# Patient Record
Sex: FEMALE | Race: WHITE | NOT HISPANIC OR LATINO | Employment: FULL TIME | URBAN - METROPOLITAN AREA
[De-identification: names, ages, dates, MRNs, and addresses within clinical notes are randomized per-mention and may not be internally consistent; named-entity substitution may affect disease eponyms.]

---

## 2020-10-09 ENCOUNTER — TELEPHONE (OUTPATIENT)
Dept: OBSTETRICS AND GYNECOLOGY | Facility: CLINIC | Age: 30
End: 2020-10-09

## 2020-10-09 ENCOUNTER — TELEPHONE (OUTPATIENT)
Dept: OBSTETRICS AND GYNECOLOGY | Age: 30
End: 2020-10-09

## 2020-10-09 NOTE — TELEPHONE ENCOUNTER
I do not have any new patient slots for a little while, if she might have retained products from a miscarriage, she should be evaluated in the emergency department.    Loretta Arana MD  10/9/2020  12:08 EDT

## 2020-10-09 NOTE — TELEPHONE ENCOUNTER
Good Morning,     Patient had a positive pregnancy test on 9/20, states she has an IUD and believes she may have miscarried starting on 9/24. She had heavy bleeding with cramps, discharge with odor and severe pain during that time. She was told she could possibly be still pregnant and she should come in to be checked as soon as possible since she still has an IUD. Would you be able to see her next week?    Please advise, thank you.

## 2020-10-09 NOTE — TELEPHONE ENCOUNTER
New Gyn thinks she recently M/C. Pt has had a paraguard for 9 years. Pt took a pregnancy test that was positive on 9-22. Pt started having bleeding 9-26, heavy with clots and cramping severely. Pt wants to see someone for f/u to make sure that everything has passed and also to see if there is anything that she needs to do as far as the paraguard . Pt states that she has never had an OB GYN doctor. [facility]  No new pt slots available soon, would you have anywhere that you could see the patient?

## 2020-10-12 ENCOUNTER — OFFICE VISIT (OUTPATIENT)
Dept: OBSTETRICS AND GYNECOLOGY | Facility: CLINIC | Age: 30
End: 2020-10-12

## 2020-10-12 VITALS — BODY MASS INDEX: 34.67 KG/M2 | DIASTOLIC BLOOD PRESSURE: 72 MMHG | SYSTOLIC BLOOD PRESSURE: 120 MMHG | WEIGHT: 202 LBS

## 2020-10-12 DIAGNOSIS — T83.32XA MALPOSITIONED INTRAUTERINE DEVICE (IUD), INITIAL ENCOUNTER: ICD-10-CM

## 2020-10-12 DIAGNOSIS — Z97.5 IUD (INTRAUTERINE DEVICE) IN PLACE: Primary | ICD-10-CM

## 2020-10-12 DIAGNOSIS — Z32.01 POSITIVE PREGNANCY TEST: ICD-10-CM

## 2020-10-12 DIAGNOSIS — Z12.4 CERVICAL CANCER SCREENING: ICD-10-CM

## 2020-10-12 PROBLEM — Z30.432 ENCOUNTER FOR REMOVAL OF INTRAUTERINE CONTRACEPTIVE DEVICE (IUD): Status: ACTIVE | Noted: 2020-10-12

## 2020-10-12 LAB
B-HCG UR QL: NEGATIVE
INTERNAL NEGATIVE CONTROL: POSITIVE
INTERNAL POSITIVE CONTROL: NEGATIVE
Lab: NORMAL

## 2020-10-12 PROCEDURE — 58301 REMOVE INTRAUTERINE DEVICE: CPT | Performed by: STUDENT IN AN ORGANIZED HEALTH CARE EDUCATION/TRAINING PROGRAM

## 2020-10-12 PROCEDURE — 99203 OFFICE O/P NEW LOW 30 MIN: CPT | Performed by: STUDENT IN AN ORGANIZED HEALTH CARE EDUCATION/TRAINING PROGRAM

## 2020-10-12 PROCEDURE — 81025 URINE PREGNANCY TEST: CPT | Performed by: STUDENT IN AN ORGANIZED HEALTH CARE EDUCATION/TRAINING PROGRAM

## 2020-10-12 NOTE — PROGRESS NOTES
Chief Complaint   Patient presents with   • Gynecologic Exam     Patient with Paraguard since . Patient c/o positive UCG in September and heavy bleeding with clotting on 20. Last pap 5 yrs ago        SUBJECTIVE:     Melissa Sánchez is a 30 y.o.  who presents with concerns of miscarriage in setting of ParaGard IUD. The patient reports having the ParaGard IUD in place since  and had a positive home pregnancy test on 20. She reports experiencing heavy vaginal bleeding with clots on 20. This lasted for 3 days and was worse on the second day with accompanying severe back pain and stabbing abdominal cramping. She reports she felt terrible for those 3 days. She reports that she is doing well now but is concerned if she miscarried or still experiencing pregnancy.     She reports having regular monthly menses every 28-30 days that lasted for 3-4 days with varying amount of bleeding.     This was her second pregnancy. She had a surgical  in .     Patient has not had a pap smear in 5 years. Denies abnormal pap smears.     Past Medical History:   Diagnosis Date   • Disease of thyroid gland       Past Surgical History:   Procedure Laterality Date   • KNEE SURGERY     • WISDOM TOOTH EXTRACTION        Social History     Tobacco Use   • Smoking status: Never Smoker   • Smokeless tobacco: Never Used   Substance Use Topics   • Alcohol use: Yes     Alcohol/week: 3.0 standard drinks     Types: 3 Cans of beer per week   • Drug use: Never     OB History   No obstetric history on file.        Review of Systems   Constitutional: Negative for chills and fever.   HENT: Negative for congestion and sore throat.    Respiratory: Negative for cough and shortness of breath.    Cardiovascular: Negative for chest pain and leg swelling.   Gastrointestinal: Negative for abdominal pain, constipation and diarrhea.   Genitourinary: Negative for dysuria and vaginal bleeding.   Skin: Negative for rash and  wound.   Neurological: Negative for dizziness and light-headedness.       OBJECTIVE:   Vitals:    10/12/20 1309   BP: 120/72   Weight: 91.6 kg (202 lb)        Physical Exam  Vitals signs reviewed. Exam conducted with a chaperone present.   Constitutional:       Appearance: Normal appearance. She is obese.   HENT:      Head: Normocephalic and atraumatic.      Right Ear: External ear normal.      Left Ear: External ear normal.   Eyes:      Extraocular Movements: Extraocular movements intact.      Pupils: Pupils are equal, round, and reactive to light.   Pulmonary:      Effort: Pulmonary effort is normal. No respiratory distress.   Abdominal:      General: There is no distension.      Palpations: Abdomen is soft. There is no mass.      Tenderness: There is no abdominal tenderness. There is no guarding or rebound.      Hernia: No hernia is present.   Genitourinary:     General: Normal vulva.      Labia:         Right: No rash, tenderness, lesion or injury.         Left: No rash, tenderness, lesion or injury.       Urethra: No prolapse or urethral lesion.      Vagina: Vaginal discharge present. No erythema, tenderness, bleeding or lesions.      Cervix: Normal.      Uterus: Normal. Not enlarged, not fixed and not tender.       Adnexa: Right adnexa normal and left adnexa normal.        Right: No mass, tenderness or fullness.          Left: No mass, tenderness or fullness.        Comments: Thick yellow discharge in vaginal vault. IUD strings visualized at external cervical os.   Musculoskeletal: Normal range of motion.         General: No swelling.   Lymphadenopathy:      Lower Body: No right inguinal adenopathy. No left inguinal adenopathy.   Skin:     General: Skin is warm and dry.   Neurological:      General: No focal deficit present.      Mental Status: She is alert and oriented to person, place, and time.   Psychiatric:         Mood and Affect: Mood normal.         Behavior: Behavior normal.     UPT: negative on  10/12/20    ASSESSMENT:   Suspect spontaneous    ParaGard IUD in place  Cervical cancer screening     PLAN:   I suspect that the patient has experienced a spontaneous  based on history provided by the patient in the setting of positive home pregnancy test. UPT today negative. Will order serum HCG to evaluate for previous pregnancy and pelvic ultrasound to evaluate for IUD malposition given recent positive pregnancy test. Follow up to be determined by imaging and lab work. Patient agrees with plan of care and all questions and concerns answered.    Pap smear with reflex HPV for ASCUS collected today for cervical cancer screening.    See below for orders    Orders Placed This Encounter   Procedures   • US Non-ob Transvaginal     Order Specific Question:   Reason for Exam:     Answer:   positive pregnancy test in setting of IUD in place   • HCG, B-subunit, Quantitative   • POC Pregnancy, Urine      No follow-ups on file.    Magnolia Diaz MD  10/12/2020  14:34 EDT     Addendum:  Patient returns to the clinic after having ultrasound performed today at [facility] that demonstrated her IUD in the lower uterine segment/cervix. Recommended removal and patient re-presents from ultrasound appointment for removal.     ParaGard IUD Removal Procedure Note    Indications: Malpositioned IUD   Pre Procedural Diagnosis: Malpositioned ParaGard IUD    Post Procedural Diagnosis: Encounter for successful removal of malpositioned ParaGard IUD     Counseling:  Patient was counseled on risks of IUD removal including but not limited to abnormal bleeding, infection, pregnancy, need for additional procedures and/or need for surgery.  All questions were answered to apparent satisfaction. We discussed that the patient should take a prenatal vitamin with 400 mcg of folic acid if she is not using reliable contraception. We discussed the family planning method and advised using ovulation predictor kits initially to determine  when she ovulates.     Procedure Details     Bimanual exam revealed anteverted, normal size uterus.  A speculum was inserted. The IUD strings were seen, grasped with a ring forcep and removed without difficulty.  The ParaGard IUD was inspected and noted to be removed in its entirety.  Patient tolerated procedure well.    IUD Information:  See medication record.    Condition:  Stable    Complications:  None    Plan:    The patient was advised to call for any fever or for prolonged or severe pain or bleeding; she was also advised regarding other contraceptive methods but declines contraception at this time. She thinks she will use condoms and the family planning method. Advised to start PNV. She was advised to use OTC analgesics as needed for mild to moderate pain.  Return to the clinic PRN for follow-up.    Magnolia Diaz MD  10/12/2020  15:17 EDT

## 2020-10-13 ENCOUNTER — TELEPHONE (OUTPATIENT)
Dept: OBSTETRICS AND GYNECOLOGY | Facility: CLINIC | Age: 30
End: 2020-10-13

## 2020-10-13 LAB — HCG INTACT+B SERPL-ACNC: <1 MIU/ML

## 2020-10-13 NOTE — TELEPHONE ENCOUNTER
Called patient to discuss results of HCG. Verified name and . We discussed that HCG <1 and no further follow up indicated. Still awaiting pap smear but patient will be notified of results. All questions and concerns indicated.    Magnolia Diaz MD  10/13/2020  10:43 EDT

## 2020-10-14 ENCOUNTER — TELEPHONE (OUTPATIENT)
Dept: OBSTETRICS AND GYNECOLOGY | Facility: CLINIC | Age: 30
End: 2020-10-14

## 2020-10-14 LAB
CONV .: NORMAL
CYTOLOGIST CVX/VAG CYTO: NORMAL
CYTOLOGY CVX/VAG DOC CYTO: NORMAL
CYTOLOGY CVX/VAG DOC THIN PREP: NORMAL
DX ICD CODE: NORMAL
HIV 1 & 2 AB SER-IMP: NORMAL
OTHER STN SPEC: NORMAL
STAT OF ADQ CVX/VAG CYTO-IMP: NORMAL

## 2020-10-14 NOTE — TELEPHONE ENCOUNTER
Called patient to discuss pap smear results. Verified name and . We reviewed negative pap smear and recommend repeat pap smear in 3 years. All questions and concerns answered.    Magnolia Diaz MD  10/14/2020  16:20 EDT

## 2021-03-13 ENCOUNTER — IMMUNIZATION (OUTPATIENT)
Dept: VACCINE CLINIC | Facility: HOSPITAL | Age: 31
End: 2021-03-13

## 2021-03-13 PROCEDURE — 0001A: CPT | Performed by: INTERNAL MEDICINE

## 2021-03-13 PROCEDURE — 91300 HC SARSCOV02 VAC 30MCG/0.3ML IM: CPT | Performed by: INTERNAL MEDICINE

## 2021-04-03 ENCOUNTER — IMMUNIZATION (OUTPATIENT)
Dept: VACCINE CLINIC | Facility: HOSPITAL | Age: 31
End: 2021-04-03

## 2021-04-03 PROCEDURE — 91300 HC SARSCOV02 VAC 30MCG/0.3ML IM: CPT | Performed by: INTERNAL MEDICINE

## 2021-04-03 PROCEDURE — 0002A: CPT | Performed by: INTERNAL MEDICINE

## 2021-12-18 ENCOUNTER — IMMUNIZATION (OUTPATIENT)
Dept: VACCINE CLINIC | Facility: HOSPITAL | Age: 31
End: 2021-12-18

## 2021-12-18 PROCEDURE — 91300 HC SARSCOV02 VAC 30MCG/0.3ML IM: CPT | Performed by: INTERNAL MEDICINE

## 2021-12-18 PROCEDURE — 0004A HC ADM SARSCOV2 30MCG/0.3ML BOOSTER: CPT | Performed by: INTERNAL MEDICINE

## 2022-03-16 ENCOUNTER — OFFICE VISIT (OUTPATIENT)
Dept: GASTROENTEROLOGY | Facility: CLINIC | Age: 32
End: 2022-03-16

## 2022-03-16 ENCOUNTER — TELEPHONE (OUTPATIENT)
Dept: GASTROENTEROLOGY | Facility: CLINIC | Age: 32
End: 2022-03-16

## 2022-03-16 VITALS — TEMPERATURE: 97.1 F | WEIGHT: 212.4 LBS | BODY MASS INDEX: 35.39 KG/M2 | HEIGHT: 65 IN

## 2022-03-16 DIAGNOSIS — K62.5 RECTAL BLEEDING: ICD-10-CM

## 2022-03-16 DIAGNOSIS — R14.0 BLOATING SYMPTOM: ICD-10-CM

## 2022-03-16 DIAGNOSIS — R19.4 CHANGE IN BOWEL HABITS: ICD-10-CM

## 2022-03-16 DIAGNOSIS — R11.2 NAUSEA AND VOMITING, INTRACTABILITY OF VOMITING NOT SPECIFIED, UNSPECIFIED VOMITING TYPE: ICD-10-CM

## 2022-03-16 DIAGNOSIS — R10.84 GENERALIZED ABDOMINAL PAIN: Primary | ICD-10-CM

## 2022-03-16 DIAGNOSIS — R19.7 DIARRHEA, UNSPECIFIED TYPE: ICD-10-CM

## 2022-03-16 DIAGNOSIS — Z80.0 FAMILY HISTORY OF COLON CANCER: ICD-10-CM

## 2022-03-16 DIAGNOSIS — R68.81 EARLY SATIETY: ICD-10-CM

## 2022-03-16 PROCEDURE — 99204 OFFICE O/P NEW MOD 45 MIN: CPT | Performed by: NURSE PRACTITIONER

## 2022-03-16 NOTE — PROGRESS NOTES
Chief Complaint   Patient presents with   • Abdominal Pain       HPI    Melissa Sánchez is a  32 y.o. female here to establish care as a new patient for complaints of abdominal pain.    This patient will also follow with Dr. Woodson.    Patient reports 4 years of generalized abdominal discomfort manifesting as bloating-aching worse over the last several months.  Comes and goes.  Associated nausea and vomiting which is a new symptom.  Frequent early satiety.  Denies dysphagia or odynophagia.  She reports several episodes of diarrhea in between she will pass formed stools.  1 episode of rectal bleeding.    Patient reports being a vegan most of her life started eating meat again 6 years ago has been following with an herbalist.    Recent CT with contrast unremarkable.    Recent hemogram, LFTs, and TSH normal.    Reports family history of colon cancer in a paternal grandparent.  She has several second-degree family members with Crohn's disease.    Past Medical History:   Diagnosis Date   • Disease of thyroid gland        Past Surgical History:   Procedure Laterality Date   • KNEE SURGERY     • MOUTH SURGERY     • WISDOM TOOTH EXTRACTION         Scheduled Meds:     Continuous Infusions: No current facility-administered medications for this visit.      PRN Meds:     Allergies   Allergen Reactions   • Influenza Virus Vaccine Anaphylaxis       Social History     Socioeconomic History   • Marital status: Single   Tobacco Use   • Smoking status: Never Smoker   • Smokeless tobacco: Never Used   Substance and Sexual Activity   • Alcohol use: Yes     Alcohol/week: 3.0 standard drinks     Types: 3 Cans of beer per week     Comment: social   • Drug use: Never       Family History   Problem Relation Age of Onset   • Hypertension Mother    • Hypertension Father    • Hypertension Maternal Grandmother    • Hypertension Maternal Grandfather    • Hypertension Paternal Grandmother    • Colon cancer Paternal Grandfather    • Hypertension  Paternal Grandfather    • Breast cancer Neg Hx    • Ovarian cancer Neg Hx    • Uterine cancer Neg Hx        Review of Systems   Constitutional: Negative for activity change, appetite change, fatigue and unexpected weight change.   HENT: Negative for trouble swallowing.    Eyes: Negative.    Respiratory: Negative.    Cardiovascular: Negative.    Gastrointestinal: Positive for abdominal pain, diarrhea, nausea and vomiting. Negative for abdominal distention, anal bleeding, blood in stool, constipation and rectal pain.   Endocrine: Negative.    Genitourinary: Negative.    Musculoskeletal: Negative.    Allergic/Immunologic: Negative.    Neurological: Negative.    Hematological: Negative.    Psychiatric/Behavioral: Negative.        Vitals:    03/16/22 1430   Temp: 97.1 °F (36.2 °C)       Physical Exam  Constitutional:       Appearance: She is well-developed.   Abdominal:      General: Bowel sounds are normal. There is no distension.      Palpations: Abdomen is soft. There is no mass.      Tenderness: There is no abdominal tenderness. There is no guarding.      Hernia: No hernia is present.   Skin:     General: Skin is warm and dry.      Capillary Refill: Capillary refill takes less than 2 seconds.   Neurological:      Mental Status: She is alert and oriented to person, place, and time.   Psychiatric:         Behavior: Behavior normal.       Assessment    Diagnoses and all orders for this visit:    1. Generalized abdominal pain (Primary)  -     Case Request; Standing  -     Case Request    2. Bloating symptom  -     Case Request; Standing  -     Case Request    3. Nausea and vomiting, intractability of vomiting not specified, unspecified vomiting type  -     Case Request; Standing  -     Case Request    4. Early satiety  -     Case Request; Standing  -     Case Request    5. Change in bowel habits  -     Case Request; Standing  -     Case Request    6. Diarrhea, unspecified type  -     Case Request; Standing  -     Case  Request    7. Rectal bleeding  -     Case Request; Standing  -     Case Request    8. Family history of colon cancer       Plan    Arrange bidirectional endoscopic examination to be performed by Dr. Woodson at [facility] rule out celiac disease, inflammatory bowel disease, establish the presence of irritable bowel syndrome.  Discussed the benefits of IBgard and FD guard, educational handouts provided to the patient.  Further recommendations and follow-up pending the aforementioned work-up.  Re/benefits of procedures reviewed the patient all questions were answered.  Consider HIDA scan in the future if warranted.         HARSHAL Ray  [facility]  [address] [place] [address]  Office: [phone]

## 2022-03-16 NOTE — TELEPHONE ENCOUNTER
ARA patient in office for EGD/CS. Scheduled 04/25/2022 with arrival time 1:30pm. Prep packet handed to patient. Also advised arrival time may vary based on [facility] guidelines. ARA Mccoy--Rebecca

## 2022-03-18 ENCOUNTER — TRANSCRIBE ORDERS (OUTPATIENT)
Dept: ADMINISTRATIVE | Facility: HOSPITAL | Age: 32
End: 2022-03-18

## 2022-03-18 DIAGNOSIS — Z01.818 OTHER SPECIFIED PRE-OPERATIVE EXAMINATION: Primary | ICD-10-CM

## 2022-03-18 NOTE — TELEPHONE ENCOUNTER
ARA patient via telephone for EGD/CS. Rescheduled  to 04/27/2022 with arrival time 8:30am. Prep packet mailed to verified address on file. Also advised arrival time may change based on [facility] guidelines. ARA Mccoy--Rebecca

## 2022-09-01 ENCOUNTER — OFFICE VISIT (OUTPATIENT)
Dept: OBSTETRICS AND GYNECOLOGY | Facility: CLINIC | Age: 32
End: 2022-09-01

## 2022-09-01 VITALS
BODY MASS INDEX: 34.82 KG/M2 | DIASTOLIC BLOOD PRESSURE: 66 MMHG | SYSTOLIC BLOOD PRESSURE: 109 MMHG | WEIGHT: 209 LBS | HEIGHT: 65 IN

## 2022-09-01 DIAGNOSIS — Z01.419 WELL WOMAN EXAM WITH ROUTINE GYNECOLOGICAL EXAM: Primary | ICD-10-CM

## 2022-09-01 DIAGNOSIS — Z30.09 ENCOUNTER FOR COUNSELING REGARDING CONTRACEPTION: ICD-10-CM

## 2022-09-01 PROCEDURE — 99395 PREV VISIT EST AGE 18-39: CPT | Performed by: STUDENT IN AN ORGANIZED HEALTH CARE EDUCATION/TRAINING PROGRAM

## 2022-09-01 PROCEDURE — 2014F MENTAL STATUS ASSESS: CPT | Performed by: STUDENT IN AN ORGANIZED HEALTH CARE EDUCATION/TRAINING PROGRAM

## 2022-09-01 NOTE — PROGRESS NOTES
"GYN Annual Exam     CC- Here for annual exam.     Melissa Sánchez is a 32 y.o. female who presents for annual well woman exam. Periods are monthly, lasting 2 days. Dysmenorrhea: moderate cramping for 1-2 days of flow. Cyclic symptoms include none. No intermenstrual bleeding, spotting, or discharge.    OB History        2    Para        Term                AB   1    Living           SAB   1    IAB        Ectopic        Molar        Multiple        Live Births                    Current contraception: condoms  History of abnormal Pap smear: no  Family history of uterine, colon or ovarian cancer: yes - paternal granfather with colon cancer  History of abnormal mammogram: n/a  Family history of breast cancer: no  Last Pap : 10/12/20- NILM    Past Medical History:   Diagnosis Date   • Disease of thyroid gland        Past Surgical History:   Procedure Laterality Date   • KNEE SURGERY     • MOUTH SURGERY     • WISDOM TOOTH EXTRACTION           Current Outpatient Medications:   •  PARAGARD INTRAUTERINE COPPER IU, by Intrauterine route., Disp: , Rfl:     Allergies   Allergen Reactions   • Influenza Virus Vaccine Anaphylaxis       Social History     Tobacco Use   • Smoking status: Never Smoker   • Smokeless tobacco: Never Used   Substance Use Topics   • Alcohol use: Yes     Alcohol/week: 3.0 standard drinks     Types: 3 Cans of beer per week     Comment: social   • Drug use: Never       Family History   Problem Relation Age of Onset   • Hypertension Mother    • Hypertension Father    • Hypertension Maternal Grandmother    • Hypertension Maternal Grandfather    • Hypertension Paternal Grandmother    • Colon cancer Paternal Grandfather    • Hypertension Paternal Grandfather    • Breast cancer Neg Hx    • Ovarian cancer Neg Hx    • Uterine cancer Neg Hx        Review of Systems   All other systems reviewed and are negative.      /66   Ht 165.1 cm (65\")   Wt 94.8 kg (209 lb)   LMP 2022   BMI 34.78 " kg/m²     Physical Exam  Vitals reviewed.   Constitutional:       General: She is not in acute distress.  HENT:      Head: Normocephalic and atraumatic.      Right Ear: External ear normal.      Left Ear: External ear normal.   Eyes:      Extraocular Movements: Extraocular movements intact.      Pupils: Pupils are equal, round, and reactive to light.   Cardiovascular:      Rate and Rhythm: Normal rate and regular rhythm.   Pulmonary:      Effort: Pulmonary effort is normal. No respiratory distress.   Chest:   Breasts: Breasts are symmetrical.      Right: No swelling, bleeding, inverted nipple, mass, nipple discharge, skin change, tenderness, axillary adenopathy or supraclavicular adenopathy.      Left: No swelling, bleeding, inverted nipple, mass, nipple discharge, skin change, tenderness, axillary adenopathy or supraclavicular adenopathy.       Abdominal:      General: There is no distension.      Palpations: Abdomen is soft.      Tenderness: There is no abdominal tenderness. There is no guarding or rebound.   Genitourinary:     General: Normal vulva.      Exam position: Lithotomy position.      Labia:         Right: No rash, tenderness, lesion or injury.         Left: No rash, tenderness, lesion or injury.       Urethra: No prolapse or urethral swelling.      Vagina: No vaginal discharge, erythema, tenderness, bleeding or lesions.      Cervix: Normal.      Uterus: Not enlarged, not fixed and not tender.       Adnexa:         Right: No mass, tenderness or fullness.          Left: No mass, tenderness or fullness.     Musculoskeletal:         General: No deformity. Normal range of motion.      Cervical back: Normal range of motion and neck supple.   Lymphadenopathy:      Upper Body:      Right upper body: No supraclavicular or axillary adenopathy.      Left upper body: No supraclavicular or axillary adenopathy.      Lower Body: No right inguinal adenopathy. No left inguinal adenopathy.   Skin:     General: Skin is  warm and dry.   Neurological:      General: No focal deficit present.      Mental Status: She is alert and oriented to person, place, and time.   Psychiatric:         Mood and Affect: Mood normal.         Behavior: Behavior normal.         Assessment     1) GYN annual well woman exam.   2) Contraception counseling      Plan     1) Breast Health - Clinical breast exam yearly, Self breast awareness monthly  2) Pap - Up to date. Per ASCCP guidelines, repeat pap smear is due in 2023 and recommend cotesting at that time.   3) Smoking status- non-smoker.  4) Activity recommends - Adult 150-300 min/week of multi-component physical activities that include balance training, aerobic and physical strengthening.    5) Contraception- Patient does not want any hormonal birth control nor does she wish to use the non-hormonal IUD. She currently is using condoms but would like another method in addition to condoms. We discussed Phexxi and how the method works, the side effects, failure rate, etc. The patient would like to use this and discussed that it can be used concomitant with condoms. Prescription sent to her pharmacy.   6) Follow up prn and one year.       Magnolia Diaz MD

## 2023-11-02 ENCOUNTER — OFFICE VISIT (OUTPATIENT)
Dept: ORTHOPEDIC SURGERY | Facility: CLINIC | Age: 33
End: 2023-11-02
Payer: MEDICAID

## 2023-11-02 VITALS — HEIGHT: 65 IN | WEIGHT: 225.4 LBS | BODY MASS INDEX: 37.55 KG/M2 | TEMPERATURE: 97.8 F

## 2023-11-02 DIAGNOSIS — M95.8 OSTEOCHONDRAL DEFECT OF ANKLE: Primary | ICD-10-CM

## 2023-11-02 DIAGNOSIS — R52 PAIN: ICD-10-CM

## 2023-11-02 PROCEDURE — 99204 OFFICE O/P NEW MOD 45 MIN: CPT | Performed by: ORTHOPAEDIC SURGERY

## 2023-11-02 RX ORDER — AZELASTINE 1 MG/ML
SPRAY, METERED NASAL
COMMUNITY

## 2023-11-02 RX ORDER — CYANOCOBALAMIN 1000 UG/ML
INJECTION, SOLUTION INTRAMUSCULAR; SUBCUTANEOUS
COMMUNITY

## 2023-11-02 RX ORDER — MELOXICAM 15 MG/1
TABLET ORAL
COMMUNITY

## 2023-11-02 RX ORDER — ASCORBIC ACID 500 MG
TABLET ORAL
COMMUNITY

## 2023-11-02 RX ORDER — DULOXETIN HYDROCHLORIDE 60 MG/1
CAPSULE, DELAYED RELEASE ORAL
COMMUNITY

## 2023-11-02 RX ORDER — FERROUS SULFATE 325(65) MG
TABLET ORAL
COMMUNITY

## 2023-11-02 RX ORDER — SPIRONOLACTONE 50 MG/1
TABLET, FILM COATED ORAL
COMMUNITY
Start: 2014-06-09

## 2023-11-02 RX ORDER — PHENTERMINE HYDROCHLORIDE 37.5 MG/1
TABLET ORAL
COMMUNITY

## 2023-11-02 RX ORDER — THYROID 30 MG/1
TABLET ORAL
COMMUNITY

## 2023-11-02 RX ORDER — ERGOCALCIFEROL 1.25 MG/1
CAPSULE ORAL
COMMUNITY

## 2023-11-02 RX ORDER — FEXOFENADINE HCL AND PSEUDOEPHEDRINE HCI 60; 120 MG/1; MG/1
TABLET, EXTENDED RELEASE ORAL
COMMUNITY

## 2023-11-02 NOTE — PROGRESS NOTES
General Exam    Patient: Melissa Sánchez    YOB: 1990    Medical Record Number: [id number]    Chief Complaints: Bilateral ankle pain    History of Present Illness:     33 y.o. female patient who presents for evaluation of bilateral ankle pain.  Patient states she started having symptoms when she was 17 and told that she would need surgery due to some disintegration of her bone.  She states she is just been continue to deal with the symptoms she had ups and downs and flareups but has been able to do okay however over the past few months the symptoms have been quite severe at her ankle mainly medially based.    Denies any numbness or tingling.  Denies any fevers, cough or shortness of breath.    Allergies:   Allergies   Allergen Reactions    Influenza Virus Vaccine Anaphylaxis       Home Medications:      Current Outpatient Medications:     Lactic Ac-Citric Ac-Pot Bitart (PHEXXI) 1.8-1-0.4 % gel, Insert 1 applicator into the vagina 1 (One) Time As Needed (sexual intercourse) for up to 12 doses., Disp: 12 applicator, Rfl: 11    ascorbic acid (VITAMIN C) 500 MG tablet, Vitamin C 500 mg tablet (Patient not taking: Reported on 11/2/2023), Disp: , Rfl:     azelastine (ASTELIN) 0.1 % nasal spray, azelastine 137 mcg (0.1 %) nasal spray aerosol (Patient not taking: Reported on 11/2/2023), Disp: , Rfl:     cholecalciferol (VITAMIN D3) 1.25 MG (35441 UT) capsule, cholecalciferol (vitamin D3) 1,250 mcg (50,000 unit) capsule  Take 1 capsule every week by oral route for 30 days. (Patient not taking: Reported on 11/2/2023), Disp: , Rfl:     cyanocobalamin (VITAMIN B-12) 1000 MCG tablet, cyanocobalamin (vit B-12) 1,000 mcg tablet  TAKE ONE TABLET BY MOUTH FOR 30 DAYS. (Patient not taking: Reported on 11/2/2023), Disp: , Rfl:     Cyanocobalamin 1000 MCG sublingual tablet, cyanocobalamin (vit B-12) 1,000 mcg sublingual tablet  Place 1 tablet every day by sublingual route for 30 days. (Patient not taking: Reported on  11/2/2023), Disp: , Rfl:     cyanocobalamin 1000 MCG/ML injection, cyanocobalamin (vit B-12) 1,000 mcg/mL injection solution (Patient not taking: Reported on 11/2/2023), Disp: , Rfl:     DULoxetine (CYMBALTA) 60 MG capsule, duloxetine 60 mg capsule,delayed release (Patient not taking: Reported on 11/2/2023), Disp: , Rfl:     ergocalciferol (ERGOCALCIFEROL) 1.25 MG (70324 UT) capsule, ergocalciferol (vitamin D2) 1,250 mcg (50,000 unit) capsule (Patient not taking: Reported on 11/2/2023), Disp: , Rfl:     ferrous sulfate 325 (65 FE) MG tablet, FeroSul 325 mg (65 mg iron) tablet (Patient not taking: Reported on 11/2/2023), Disp: , Rfl:     fexofenadine-pseudoephedrine (Allegra-D Allergy & Congestion)  MG per 12 hr tablet, Allegra-D 12 Hour 60 mg-120 mg tablet,extended release (Patient not taking: Reported on 11/2/2023), Disp: , Rfl:     loratadine-pseudoephedrine (CLARITIN-D 12-hour) 5-120 MG per 12 hr tablet, Loratadine-D 5 mg-120 mg tablet,extended release 12 hr (Patient not taking: Reported on 11/2/2023), Disp: , Rfl:     meloxicam (MOBIC) 15 MG tablet, meloxicam 15 mg tablet (Patient not taking: Reported on 11/2/2023), Disp: , Rfl:     PARAGARD INTRAUTERINE COPPER IU, by Intrauterine route., Disp: , Rfl:     phentermine (ADIPEX-P) 37.5 MG tablet, phentermine 37.5 mg tablet  Take 1 tablet every day by oral route for 30 days. (Patient not taking: Reported on 11/2/2023), Disp: , Rfl:     spironolactone (ALDACTONE) 50 MG tablet, Take  by mouth. (Patient not taking: Reported on 11/2/2023), Disp: , Rfl:     Thyroid (NP THYROID) 30 MG PO tablet, NP Thyroid 30 mg tablet (Patient not taking: Reported on 11/2/2023), Disp: , Rfl:     Past Medical History:   Diagnosis Date    Disease of thyroid gland        Past Surgical History:   Procedure Laterality Date    KNEE SURGERY      MOUTH SURGERY      WISDOM TOOTH EXTRACTION         Social History     Occupational History    Not on file   Tobacco Use    Smoking status: Never  "   Smokeless tobacco: Never   Vaping Use    Vaping Use: Never used   Substance and Sexual Activity    Alcohol use: Yes     Alcohol/week: 3.0 standard drinks of alcohol     Types: 3 Cans of beer per week     Comment: social    Drug use: Never    Sexual activity: Yes     Comment: Patient with Paraguard since 2012.      Social History     Social History Narrative    Not on file       Family History   Problem Relation Age of Onset    Hypertension Mother     Hypertension Father     Hypertension Maternal Grandmother     Hypertension Maternal Grandfather     Hypertension Paternal Grandmother     Colon cancer Paternal Grandfather     Hypertension Paternal Grandfather     Breast cancer Neg Hx     Ovarian cancer Neg Hx     Uterine cancer Neg Hx        Review of Systems:      Constitutional: Denies fever, shaking or chills         All other pertinent positives and negatives as noted above in HPI.    Physical Exam: 33 y.o. female    Vitals:    11/02/23 1132   Temp: 97.8 °F (36.6 °C)   TempSrc: Temporal   Weight: 102 kg (225 lb 6.4 oz)   Height: 165.1 cm (65\")       General:  Patient is awake and alert.  Appears in no acute distress or discomfort.      Musculoskeletal/Extremities:    Bilateral lower extremities evaluated tenderness on the medial aspects of bilateral ankles limited motion due to pain.  Motor and sensory intact distally.         Radiology:       3 views right and left ankle AP, lateral and oblique taken and reviewed to evaluate the patient's complaint/s.    Imaging demonstrates osteochondral defects with accompanying fracture of the medial shoulder of the talus of each ankle.     No imaging for comparison.    Assessment: Bilateral talar osteochondral defects      Plan:      Discussed the findings with the patient and told her these findings may likely require a surgical intervention however for these type of diagnosis I recommend the foot and ankle specialist who has more expertise in this area.  Told her I would " get her information to Dr. Huang the [facility] who is agreed to see her.  During the interim we will go ahead and order bilateral MRIs in which she will take all imaging with her to see him.           We will plan for follow up as needed.    All questions were answered.  Patient understands and agrees with the plan.    Esteban Flores MD    11/02/2023    CC to Provider, No Known

## 2023-11-03 ENCOUNTER — TELEPHONE (OUTPATIENT)
Dept: ORTHOPEDIC SURGERY | Facility: CLINIC | Age: 33
End: 2023-11-03

## 2023-11-03 NOTE — TELEPHONE ENCOUNTER
Caller: Melissa Sánchez    Relationship: Self    Best call back number: [phone] (home)     What specialty or service is being requested: MRI FOR LEFT AND RIGHT ANKLE     What is the office location: [facility]     Any additional details: SHE NEEDS TO BE SEEN TODAY OR TOMORROW WITH [facility] FOR HER MRI'S THAT WERE ORDERED FOR HER ANKLES. PLEASE HAVE THOSE SENT OVER [facility]

## 2023-11-07 ENCOUNTER — TELEPHONE (OUTPATIENT)
Dept: ORTHOPEDIC SURGERY | Facility: CLINIC | Age: 33
End: 2023-11-07
Payer: MEDICAID

## 2023-11-07 ENCOUNTER — OFFICE VISIT (OUTPATIENT)
Dept: OBSTETRICS AND GYNECOLOGY | Facility: CLINIC | Age: 33
End: 2023-11-07
Payer: MEDICAID

## 2023-11-07 VITALS
WEIGHT: 222 LBS | BODY MASS INDEX: 36.99 KG/M2 | DIASTOLIC BLOOD PRESSURE: 78 MMHG | HEIGHT: 65 IN | SYSTOLIC BLOOD PRESSURE: 130 MMHG

## 2023-11-07 DIAGNOSIS — Z01.419 WELL WOMAN EXAM WITH ROUTINE GYNECOLOGICAL EXAM: Primary | ICD-10-CM

## 2023-11-07 DIAGNOSIS — R20.0 NUMBNESS OF PERINEUM: ICD-10-CM

## 2023-11-07 DIAGNOSIS — Z30.019 ENCOUNTER FOR INITIAL PRESCRIPTION OF CONTRACEPTIVES, UNSPECIFIED CONTRACEPTIVE: ICD-10-CM

## 2023-11-07 DIAGNOSIS — E66.9 OBESITY (BMI 30-39.9): ICD-10-CM

## 2023-11-07 DIAGNOSIS — Z12.4 CERVICAL CANCER SCREENING: ICD-10-CM

## 2023-11-07 NOTE — PROGRESS NOTES
GYN Annual Exam     CC- Here for annual exam.     Melissa Sánchez is a 33 y.o. female who presents for annual well woman exam. Periods are regular every 28-30 days, lasting 2 days. Dysmenorrhea:mild, occurring premenstrually. Cyclic symptoms include none. No intermenstrual bleeding, spotting, or discharge.  She would like to restart using Phexxi again for birth control.   She would like to be seen by pelvic floor physical therapy for going decreased sensation and numbness in her pelvis and sometimes with orgasm.     OB History          2    Para        Term                AB   1    Living             SAB   1    IAB        Ectopic        Molar        Multiple        Live Births                    Current contraception: condoms  History of abnormal Pap smear: no  Family history of uterine, colon or ovarian cancer: yes - paternal grandfather had colon cancer  History of abnormal mammogram:  n/a  Family history of breast cancer: no  Last Pap : 10/12/20- NILM     Past Medical History:   Diagnosis Date    Disease of thyroid gland        Past Surgical History:   Procedure Laterality Date    KNEE SURGERY      MOUTH SURGERY      WISDOM TOOTH EXTRACTION         No current outpatient medications on file.    Allergies   Allergen Reactions    Influenza Virus Vaccine Anaphylaxis       Social History     Tobacco Use    Smoking status: Never    Smokeless tobacco: Never   Vaping Use    Vaping Use: Never used   Substance Use Topics    Alcohol use: Yes     Alcohol/week: 3.0 standard drinks of alcohol     Types: 3 Cans of beer per week     Comment: social    Drug use: Never       Family History   Problem Relation Age of Onset    Hypertension Mother     Hypertension Father     Hypertension Maternal Grandmother     Hypertension Maternal Grandfather     Hypertension Paternal Grandmother     Colon cancer Paternal Grandfather     Hypertension Paternal Grandfather     Breast cancer Neg Hx     Ovarian cancer Neg Hx     Uterine  "cancer Neg Hx        Review of Systems   All other systems reviewed and are negative.      /78   Ht 165.1 cm (65\")   Wt 101 kg (222 lb)   LMP 10/26/2023   BMI 36.94 kg/m²     Physical Exam  Vitals reviewed. Exam conducted with a chaperone present.   Constitutional:       General: She is not in acute distress.  HENT:      Head: Normocephalic and atraumatic.      Right Ear: External ear normal.      Left Ear: External ear normal.   Eyes:      Extraocular Movements: Extraocular movements intact.      Pupils: Pupils are equal, round, and reactive to light.   Cardiovascular:      Rate and Rhythm: Normal rate.   Pulmonary:      Effort: Pulmonary effort is normal. No respiratory distress.   Chest:   Breasts:     Right: No swelling, bleeding, inverted nipple, mass, nipple discharge, skin change or tenderness.      Left: No swelling, bleeding, inverted nipple, mass, nipple discharge, skin change or tenderness.   Abdominal:      General: There is no distension.      Palpations: Abdomen is soft.      Tenderness: There is no abdominal tenderness. There is no guarding or rebound.   Genitourinary:     General: Normal vulva.      Exam position: Lithotomy position.      Labia:         Right: No rash, tenderness, lesion or injury.         Left: No rash, tenderness, lesion or injury.       Urethra: No prolapse or urethral swelling.      Vagina: No vaginal discharge, erythema, tenderness, bleeding or lesions.      Cervix: Normal.      Uterus: Not enlarged, not fixed and not tender.       Adnexa:         Right: No mass, tenderness or fullness.          Left: No mass, tenderness or fullness.     Musculoskeletal:         General: No deformity. Normal range of motion.      Cervical back: Normal range of motion and neck supple.   Lymphadenopathy:      Upper Body:      Right upper body: No supraclavicular or axillary adenopathy.      Left upper body: No supraclavicular or axillary adenopathy.      Lower Body: No right inguinal " adenopathy. No left inguinal adenopathy.   Skin:     General: Skin is warm and dry.   Neurological:      General: No focal deficit present.      Mental Status: She is alert and oriented to person, place, and time.   Psychiatric:         Mood and Affect: Mood normal.         Behavior: Behavior normal.       Assessment     1) GYN annual well woman exam.   2) Cervical cancer screening   3) Encounter for birth control   4) Numbness of perineum   5) Obesity- BMI 36.94      Plan     1) Breast Health - Clinical breast exam yearly, Self breast awareness monthly.   2) Pap - Collected pap smear with HPV cotesting today for cervical cancer screening.   3) Smoking status- non-smoker  4) Activity recommends - Adult 150-300 min/week of multi-component physical activities that include balance training, aerobic and physical strengthening. She is aware of increased BMI - 36.94 and has been working to lose weight. Tried to get approved for Ozemipic but didn't go through.   5) Contraception - She would like to resume using Phexxi and new prescription sent to her pharmacy.   6) Numbness and decreased sensation of the pelvis- Patient desires referral to pelvic floor PT for help in managing symptoms of decreased sensation and numbness in her pelvis.   7) Follow up prn and one year.       Magnolia Diaz MD  11/7/2023  10:52 EST

## 2023-11-07 NOTE — TELEPHONE ENCOUNTER
Spoke with patient and let her know the office was working on this request today as this was put in 11/2/23 for both her RT and LT ankles and at this time her INS is requesting office notes which have been sent today. Have given patient my direct line should she have any other issues.

## 2023-11-07 NOTE — TELEPHONE ENCOUNTER
Hub staff attempted to follow warm transfer process and was unsuccessful    Caller: JESSICA VICENTE    Relationship to patient: SELF    Best call back number: [phone]    Patient is needing: PATIENT CALLING TO SPEAK TO  IN REGARDS TO PRE-AUTH THAT WAS SUPPOSED TO HAVE BEEN PUT IN A WEEK AGO AND HAS NOT. PLEASE CALL HER BACK ASAP.

## 2023-11-09 ENCOUNTER — TELEPHONE (OUTPATIENT)
Dept: ORTHOPEDIC SURGERY | Facility: CLINIC | Age: 33
End: 2023-11-09
Payer: MEDICAID

## 2023-11-10 DIAGNOSIS — M95.8 OSTEOCHONDRAL DEFECT OF ANKLE: ICD-10-CM

## 2023-11-10 LAB
CYTOLOGIST CVX/VAG CYTO: NORMAL
CYTOLOGY CVX/VAG DOC CYTO: NORMAL
CYTOLOGY CVX/VAG DOC THIN PREP: NORMAL
DX ICD CODE: NORMAL
HIV 1 & 2 AB SER-IMP: NORMAL
HPV I/H RISK 4 DNA CVX QL PROBE+SIG AMP: NEGATIVE
Lab: NORMAL
OTHER STN SPEC: NORMAL
STAT OF ADQ CVX/VAG CYTO-IMP: NORMAL

## 2023-11-27 ENCOUNTER — OFFICE VISIT (OUTPATIENT)
Dept: ORTHOPEDIC SURGERY | Facility: CLINIC | Age: 33
End: 2023-11-27
Payer: MEDICAID

## 2023-11-27 VITALS — TEMPERATURE: 97.1 F | HEIGHT: 65 IN | BODY MASS INDEX: 37.72 KG/M2 | WEIGHT: 226.4 LBS

## 2023-11-27 DIAGNOSIS — I89.0 LYMPHEDEMA: Primary | ICD-10-CM

## 2023-11-27 DIAGNOSIS — R52 PAIN: ICD-10-CM

## 2023-11-27 DIAGNOSIS — M17.2 POST-TRAUMATIC OSTEOARTHRITIS OF BOTH KNEES: ICD-10-CM

## 2023-11-27 PROCEDURE — 99214 OFFICE O/P EST MOD 30 MIN: CPT | Performed by: ORTHOPAEDIC SURGERY

## 2023-11-27 NOTE — PROGRESS NOTES
General Exam    Patient: Melissa Sánchez    YOB: 1990    Medical Record Number: [id number]    Chief Complaints: Bilateral knee/leg swelling and pain    History of Present Illness:     33 y.o. female patient who presents for evaluation of bilateral knee/leg swelling and pain.  Patient states she had history of knee issues she states she has blown out both of her knees requiring surgery due to ACL, MCL, PCL meniscal injuries.  Surgeries done by Dr. Devon Valente who had retired.  She did see a provider with [facility] however she ended up getting a second opinion but she cannot recall who with.  She states that they told her she may need knee aspirations and conservative treatment.  She did recently see one of the foot and ankle specialist who will be doing several surgeries given her problems with her chad.  She states she will be nonweightbearing for about 8 months or so.  She states her legs cause some generalized swelling she states she feels her knees are leaky.  She states her mother who is a massage therapist helps with some lymphedema treatments.  She states it is her lower legs as well as the knee that tends to swell and become sensitive all over.  Her activity has been restricted mainly due to her ankles but her legs are not helping because she states.    Denies any numbness or tingling.  Denies any fevers, cough or shortness of breath.    Allergies:   Allergies   Allergen Reactions    Influenza Virus Vaccine Anaphylaxis       Home Medications:      Current Outpatient Medications:     Lactic Ac-Citric Ac-Pot Bitart (PHEXXI) 1.8-1-0.4 % gel, Insert 1 applicator into the vagina 1 (One) Time As Needed (sexual intercourse) for up to 1 dose., Disp: 12 applicator, Rfl: 12    Past Medical History:   Diagnosis Date    Disease of thyroid gland        Past Surgical History:   Procedure Laterality Date    KNEE SURGERY      MOUTH SURGERY      WISDOM TOOTH EXTRACTION         Social History  "    Occupational History    Not on file   Tobacco Use    Smoking status: Never    Smokeless tobacco: Never   Vaping Use    Vaping Use: Never used   Substance and Sexual Activity    Alcohol use: Yes     Alcohol/week: 3.0 standard drinks of alcohol     Types: 3 Cans of beer per week     Comment: social    Drug use: Never    Sexual activity: Yes     Comment: Patient with [facility] since 2012.      Social History     Social History Narrative    Not on file       Family History   Problem Relation Age of Onset    Hypertension Mother     Hypertension Father     Hypertension Maternal Grandmother     Hypertension Maternal Grandfather     Hypertension Paternal Grandmother     Colon cancer Paternal Grandfather     Hypertension Paternal Grandfather     Breast cancer Neg Hx     Ovarian cancer Neg Hx     Uterine cancer Neg Hx        Review of Systems:      Constitutional: Denies fever, shaking or chills         All other pertinent positives and negatives as noted above in HPI.    Physical Exam: 33 y.o. female    Vitals:    11/27/23 0826   Temp: 97.1 °F (36.2 °C)   TempSrc: Temporal   Weight: 103 kg (226 lb 6.4 oz)   Height: 165.1 cm (65\")       General:  Patient is awake and alert.  Appears in no acute distress or discomfort.      Musculoskeletal/Extremities:    Bilateral lower extremities examined there is some generalized swelling of the legs with tenderness to just even gentle palpation.  This seems to be more consistent with lymphedema.  I do not appreciate any overt knee joint swelling.  Gentle knee range of motion as tolerated by the patient.  Motor and sensory intact distally.         Radiology:       AP pelvis as well as 4 views of the left and right knee AP, lateral, PA notch and sunrise taken reviewed to evaluate the patient's complaint/s.    AP pelvis shows overall preservation of joint space.    Knee imaging shows evidence of ACL surgeries bilaterally there is still buttons on the right knee.  There is degenerative " changes across all 3 compartments of the left as well as some degenerative changes on the right predominantly involving the medial compartment.  Evidence of early bone sclerosis and some osteophyte formation.  No acute fractures noted.  No significant joint swelling noted.   No imaging for comparison.    Assessment: Bilateral lower extremity lymphedema, bilateral knee osteoarthritis      Plan:      Discussed the findings with the patient she does have some degenerative changes in regards to her knees and has had substantial surgery due to previous injury sometime ago.  Her main complaints do seem to be more suggestive of lymphedema that we will recommend conservative treatment this time consisting of rest, ice, formal physical therapy.  Also she can do some aquatic therapy.  She has a long journey ahead of her given the ankle surgeries she will need but we will see if we can make an effort to get her legs feeling somewhat better for this.           We will plan for follow up as needed.    All questions were answered.  Patient understands and agrees with the plan.    Esteban Flores MD    11/27/2023    CC to Provider, No Known        Answers submitted by the patient for this visit:  Other (Submitted on 11/20/2023)  Please describe your symptoms.: Knee pain, swelling, polyps releasing fluid - interested in what it would entsil to drain knee  Have you had these symptoms before?: Yes  How long have you been having these symptoms?: Greater than 2 weeks  Please list any medications you are currently taking for this condition.: None  Please describe any probable cause for these symptoms. : Blown out knee  Primary Reason for Visit (Submitted on 11/20/2023)  What is the primary reason for your visit?: Other

## 2023-12-05 ENCOUNTER — HOSPITAL ENCOUNTER (OUTPATIENT)
Dept: PHYSICAL THERAPY | Facility: HOSPITAL | Age: 33
Setting detail: THERAPIES SERIES
Discharge: HOME | End: 2023-12-05
Payer: MEDICAID

## 2023-12-05 ENCOUNTER — TELEPHONE (OUTPATIENT)
Dept: ORTHOPEDIC SURGERY | Facility: CLINIC | Age: 33
End: 2023-12-05
Payer: MEDICAID

## 2023-12-05 DIAGNOSIS — I89.0 LYMPHEDEMA: Primary | ICD-10-CM

## 2023-12-05 PROCEDURE — 97162 PT EVAL MOD COMPLEX 30 MIN: CPT

## 2023-12-05 NOTE — TELEPHONE ENCOUNTER
Provider: DR CLEARY     Caller: PATIENT     Relationship to Patient: SELF     Pharmacy: N/A     Phone Number: [phone]    Reason for Call: PATIENT IS ASKING THAT YOU SEND ORDER FOR AQUATIC THERAPY TO [facility]. YOU DO NOT NEED TO SEND TO [facility]    When was the patient last seen: 12-5-23
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
## 2023-12-05 NOTE — TELEPHONE ENCOUNTER
Caller: Melissa Sánchez    Relationship: Self    Best call back number:     What is the medical concern/diagnosis: RIGHT LEG    What specialty or service is being requested: AQUATIC THERAPY    What is the provider, practice or medical service name: [facility] OR [facility] OR [facility]    Any additional details: PHYSICAL THERAPY TOLD HER LYMPHADEMA THERAPY ISNT GOING TO HELP AND SHE WOULDN'T EVEN BE ABLE TO START TREATMENT UNTIL MARCHWITH HOW BACKED UP THEY ARE. AND SHE WILL HAVE A CAST ON HER LEG THEN. THE PHYSICAL THERAPIST SUGGESTED AQUATIC THERAPY.

## 2023-12-07 ENCOUNTER — TELEPHONE (OUTPATIENT)
Dept: ORTHOPEDIC SURGERY | Facility: CLINIC | Age: 33
End: 2023-12-07
Payer: MEDICAID

## 2023-12-07 DIAGNOSIS — M17.2 POST-TRAUMATIC OSTEOARTHRITIS OF BOTH KNEES: Primary | ICD-10-CM

## 2023-12-07 DIAGNOSIS — I89.0 LYMPHEDEMA: ICD-10-CM

## 2023-12-07 DIAGNOSIS — M95.8 OSTEOCHONDRAL DEFECT OF ANKLE: ICD-10-CM

## 2023-12-07 NOTE — TELEPHONE ENCOUNTER
Caller: Melissa Sánchez     Relationship: Self     Best call back number: [phone]     What is the medical concern/diagnosis: RIGHT LEG     What specialty or service is being requested: AQUATIC THERAPY     What is the provider, practice or medical service name: [facility]      Any additional details: PHYSICAL THERAPY TOLD HER LYMPHADEMA THERAPY ISNT GOING TO HELP AND SHE WOULDN'T EVEN BE ABLE TO START TREATMENT UNTIL MARCHWITH HOW BACKED UP THEY ARE. AND SHE WILL HAVE A CAST ON HER LEG THEN. THE PHYSICAL THERAPIST SUGGESTED AQUATIC THERAPY.          PLEASE FAX REFERRAL  [phone].  PLEASE CALL PATIENT WHEN SENT IF WE CAN SEND TODAY CAN GET SEEN TOMORROW

## 2024-09-18 ENCOUNTER — TELEPHONE (OUTPATIENT)
Dept: OBSTETRICS AND GYNECOLOGY | Facility: CLINIC | Age: 34
End: 2024-09-18

## 2024-09-18 NOTE — TELEPHONE ENCOUNTER
Caller: Melissa Sánchez    Relationship to patient: Self    Best call back number: [phone]    Patient is needing: EST PT OF DR. BROWNING. ASKING FOR REFERRAL FOR [facility] FOR PCOS. PT PCP SENT REFERRAL TO [facility]. THAT REFERRAL WAS DENIED DUE TO PCP HAVING LACK OF CLINICAL INFORMATION FOR REASON TO BE SEEN AT ENDOCRINOLOGY. PT ASKING IF DR. BROWNING CAN DO A REFERRAL AND SEND IN CLINICAL INFORMATION WITH REFERRAL. LAST SEEN 11/7/2023 FOR ANNUAL

## 2024-09-23 ENCOUNTER — TELEPHONE (OUTPATIENT)
Dept: OBSTETRICS AND GYNECOLOGY | Facility: CLINIC | Age: 34
End: 2024-09-23
Payer: MEDICAID

## 2024-09-23 DIAGNOSIS — Z01.89 PATIENT REQUEST FOR DIAGNOSTIC TESTING: Primary | ICD-10-CM

## 2024-09-23 NOTE — TELEPHONE ENCOUNTER
Caller: Melissa Sánchez    Relationship to patient: Self    Best call back number: [phone]    Patient is needing: PATIENT CALLED AND STATED THAT SHE CALLED LAST WEEK BUT DOESN'T THINK THAT THE PERSON SHE SPOKE WITH UNDERSTOOD WHAT WE SHE WAS NEEDING     PATIENT STATED THAT SHE HAS ALREADY BEEN REFERRED TO ENDOCRINOLOGY AT [facility] FOR PCOS, HOWEVER THEY ARE UNABLE TO ACCEPT HER REFERRAL BECAUSE THERE IS NOTHING DOCUMENTED IN HER CHART THAT SHE HAS PCOS, HOWEVER PATIENT STATES THAT SHE WAS TOLD BY DR BROWNING AT HER LAST TWO VISITS THAT SHE DOES HAVE PCOS    PATIENT WOULD LIKE TO KNOW IF SHE CAN COME IN FOR LABS TO VERIFY THE PCOS DIAGNOSIS AND ONCE COMPLETED IF THE LABS CAN BE FAXED TO [facility]    HUB UNABLE TO WARM TRANSFER CALL TO PRACTICE

## 2024-09-24 ENCOUNTER — LAB (OUTPATIENT)
Dept: OBSTETRICS AND GYNECOLOGY | Facility: CLINIC | Age: 34
End: 2024-09-24
Payer: MEDICAID

## 2024-09-24 DIAGNOSIS — Z01.89 PATIENT REQUEST FOR DIAGNOSTIC TESTING: ICD-10-CM

## 2024-09-25 LAB
DHEA-S SERPL-MCNC: 387 UG/DL (ref 84.8–378)
ERYTHROCYTE [DISTWIDTH] IN BLOOD BY AUTOMATED COUNT: 11.9 % (ref 12.3–15.4)
ESTRADIOL SERPL-MCNC: 302 PG/ML
FSH SERPL-ACNC: 9.4 MIU/ML
HCT VFR BLD AUTO: 42.7 % (ref 34–46.6)
HGB BLD-MCNC: 14.2 G/DL (ref 12–15.9)
MCH RBC QN AUTO: 29.1 PG (ref 26.6–33)
MCHC RBC AUTO-ENTMCNC: 33.3 G/DL (ref 31.5–35.7)
MCV RBC AUTO: 87.5 FL (ref 79–97)
PLATELET # BLD AUTO: 328 10*3/MM3 (ref 140–450)
PROLACTIN SERPL-MCNC: 31.4 NG/ML (ref 4.8–33.4)
RBC # BLD AUTO: 4.88 10*6/MM3 (ref 3.77–5.28)
TESTOST SERPL-MCNC: 60 NG/DL (ref 8–60)
TSH SERPL DL<=0.005 MIU/L-ACNC: 0.8 UIU/ML (ref 0.27–4.2)
WBC # BLD AUTO: 8.9 10*3/MM3 (ref 3.4–10.8)

## 2024-09-26 ENCOUNTER — TELEPHONE (OUTPATIENT)
Dept: ORTHOPEDIC SURGERY | Facility: CLINIC | Age: 34
End: 2024-09-26

## 2024-09-26 NOTE — TELEPHONE ENCOUNTER
Caller: JESSICA VICENTE     Relationship to patient:PATIENT      Best call back number: [phone]    Chief complaint: Lymphedema-     Type of visit: NEW PROBLEM     Requested date: ASAP      If rescheduling, when is the original appointment: 10/01/2024     Additional notes:PATIENT REQUESTING TO COME IN SOONER - THE RESULTS OF THIS APPOINTMENT LEADS TO OTHER TREATMENT SHE NEEDS TO GET ASAP AND WOULD LIKE TO COME IN SOONER

## 2024-09-27 ENCOUNTER — OFFICE VISIT (OUTPATIENT)
Dept: FAMILY MEDICINE CLINIC | Facility: CLINIC | Age: 34
End: 2024-09-27
Payer: MEDICAID

## 2024-09-27 VITALS
SYSTOLIC BLOOD PRESSURE: 110 MMHG | OXYGEN SATURATION: 98 % | HEIGHT: 65 IN | WEIGHT: 221.1 LBS | TEMPERATURE: 95.4 F | DIASTOLIC BLOOD PRESSURE: 62 MMHG | RESPIRATION RATE: 14 BRPM | BODY MASS INDEX: 36.84 KG/M2 | HEART RATE: 74 BPM

## 2024-09-27 DIAGNOSIS — Z76.89 ENCOUNTER TO ESTABLISH CARE: Primary | ICD-10-CM

## 2024-09-27 DIAGNOSIS — E28.2 PCOS (POLYCYSTIC OVARIAN SYNDROME): ICD-10-CM

## 2024-09-27 DIAGNOSIS — E88.819 INSULIN RESISTANCE: ICD-10-CM

## 2024-09-27 DIAGNOSIS — E55.9 VITAMIN D DEFICIENCY: ICD-10-CM

## 2024-09-27 PROBLEM — M79.7 FIBROMYALGIA: Status: ACTIVE | Noted: 2024-09-27

## 2024-09-27 PROBLEM — L70.9 ACNE: Status: ACTIVE | Noted: 2024-09-27

## 2024-09-27 PROBLEM — F41.9 ANXIETY: Status: ACTIVE | Noted: 2024-09-27

## 2024-09-27 PROBLEM — M25.561 CHRONIC KNEE PAIN AFTER TOTAL REPLACEMENT OF BOTH KNEE JOINTS: Status: ACTIVE | Noted: 2024-09-27

## 2024-09-27 PROBLEM — M25.562 CHRONIC KNEE PAIN AFTER TOTAL REPLACEMENT OF BOTH KNEE JOINTS: Status: ACTIVE | Noted: 2024-09-27

## 2024-09-27 PROBLEM — Z96.653 CHRONIC KNEE PAIN AFTER TOTAL REPLACEMENT OF BOTH KNEE JOINTS: Status: ACTIVE | Noted: 2024-09-27

## 2024-09-27 PROBLEM — G89.28 CHRONIC KNEE PAIN AFTER TOTAL REPLACEMENT OF BOTH KNEE JOINTS: Status: ACTIVE | Noted: 2024-09-27

## 2024-09-27 PROCEDURE — 1126F AMNT PAIN NOTED NONE PRSNT: CPT | Performed by: INTERNAL MEDICINE

## 2024-09-27 PROCEDURE — 99204 OFFICE O/P NEW MOD 45 MIN: CPT | Performed by: INTERNAL MEDICINE

## 2024-09-29 LAB
17OHP SERPL-MCNC: 218 NG/DL
25(OH)D3+25(OH)D2 SERPL-MCNC: 30.7 NG/ML (ref 30–100)
BUN SERPL-MCNC: 9 MG/DL (ref 6–20)
BUN/CREAT SERPL: 10 (ref 9–23)
CALCIUM SERPL-MCNC: 9.9 MG/DL (ref 8.7–10.2)
CHLORIDE SERPL-SCNC: 99 MMOL/L (ref 96–106)
CHOLEST SERPL-MCNC: 182 MG/DL (ref 100–199)
CHOLEST/HDLC SERPL: 2.6 RATIO (ref 0–4.4)
CO2 SERPL-SCNC: 21 MMOL/L (ref 20–29)
CREAT SERPL-MCNC: 0.86 MG/DL (ref 0.57–1)
EGFRCR SERPLBLD CKD-EPI 2021: 91 ML/MIN/1.73
GLUCOSE SERPL-MCNC: 86 MG/DL (ref 70–99)
HBA1C MFR BLD: 5.4 % (ref 4.8–5.6)
HDLC SERPL-MCNC: 69 MG/DL
LDLC SERPL CALC-MCNC: 94 MG/DL (ref 0–99)
POTASSIUM SERPL-SCNC: 4.3 MMOL/L (ref 3.5–5.2)
SODIUM SERPL-SCNC: 137 MMOL/L (ref 134–144)
TRIGL SERPL-MCNC: 106 MG/DL (ref 0–149)
VLDLC SERPL CALC-MCNC: 19 MG/DL (ref 5–40)

## 2024-10-01 ENCOUNTER — OFFICE VISIT (OUTPATIENT)
Dept: ORTHOPEDIC SURGERY | Facility: CLINIC | Age: 34
End: 2024-10-01
Payer: MEDICAID

## 2024-10-01 VITALS — BODY MASS INDEX: 36.72 KG/M2 | TEMPERATURE: 98.7 F | HEIGHT: 65 IN | WEIGHT: 220.4 LBS

## 2024-10-01 DIAGNOSIS — G89.29 CHRONIC PAIN OF MULTIPLE JOINTS: Primary | ICD-10-CM

## 2024-10-01 DIAGNOSIS — M25.50 CHRONIC PAIN OF MULTIPLE JOINTS: Primary | ICD-10-CM

## 2024-10-01 DIAGNOSIS — I89.0 LYMPHEDEMA: ICD-10-CM

## 2024-10-01 DIAGNOSIS — M17.2 POST-TRAUMATIC OSTEOARTHRITIS OF BOTH KNEES: ICD-10-CM

## 2024-10-01 NOTE — PROGRESS NOTES
Patient: Melissa Sánchez  YOB: 1990 34 y.o. female  Medical Record Number: [id number]    Chief Complaints:   Chief Complaint   Patient presents with    Left Knee - Follow-up    Right Knee - Follow-up       History of Present Illness:Melissa Sánchez is a 34 y.o. female who presents for follow-up of bilateral knee and leg pain.  Patient was seen about a year ago diagnosed with bilateral lower extremity lymphedema and arthritis of the knees.  Does have a history of an ACL repair of the right knee and work on the left knee.  Patient is already undergone surgery of the right ankle and has a left ankle surgery upcoming.  Patient has had a long history of soft tissue and joint discomfort.  She has pain in multiple joints that varies.  She has a history of fibromyalgia.  Patient also complains of multiple spasms throughout that can be constant in nature at times.  She has not been willing to take any kind medication more so style living and lifestyle therapeutic options such as holistic herbal therapies were tried.  At this point patient is interested in further evaluation and would consider medical therapies if recommended and agreed upon.    Allergies:   Allergies   Allergen Reactions    Influenza Virus Vaccine Anaphylaxis       Medications:   Current Outpatient Medications   Medication Sig Dispense Refill    Lactic Ac-Citric Ac-Pot Bitart (PHEXXI) 1.8-1-0.4 % gel Insert 1 applicator into the vagina 1 (One) Time As Needed (sexual intercourse) for up to 1 dose. 12 applicator 12    metFORMIN (GLUCOPHAGE) 850 MG tablet Take 1 tablet by mouth Daily With Breakfast. 30 tablet 3     No current facility-administered medications for this visit.         The following portions of the patient's history were reviewed and updated as appropriate: allergies, current medications, past family history, past medical history, past social history, past surgical history and problem list.    Review of Systems:   A 14 point  "review of systems was performed. All systems negative except pertinent positives/negative listed in HPI above    Physical Exam:   Vitals:    10/01/24 1320   Temp: 98.7 °F (37.1 °C)   TempSrc: Temporal   Weight: 100 kg (220 lb 6.4 oz)   Height: 165.1 cm (65\")   PainSc:   8   PainLoc: Knee       General: A and O x 3, ASA, NAD    SCLERA:    Normal    DENTITION:   Normal    Bilateral lower extremities examined still some sensitivity throughout gentle knee range of motion is overall well-tolerated.  Does have recent right ankle surgery and upcoming left ankle is noted.  Motor and sensory is intact distally.  Today appears to be a better day.          Assessment/Plan:  Multiple joint pain, chronic soft tissue pain    Discussed the findings with the patient she does have some degenerative changes noted previously but getting the amount of joint discomfort and variability of what joints are symptomatic as well as soft tissues with increased sensitivity, muscle spasms etc. I think that patient would benefit from further evaluation with rheumatology for recommendations on possible therapeutic measures that would be beneficial for the patient.  We will place a referral.  Patient will follow-up with orthopedics on as-needed basis at this time.  All questions answered.  Patient understands and agrees with the plan.    Total time 30 mintues  "

## 2024-10-10 DIAGNOSIS — E88.819 INSULIN RESISTANCE: ICD-10-CM

## 2024-10-10 DIAGNOSIS — E55.9 VITAMIN D DEFICIENCY: Primary | ICD-10-CM

## 2024-10-10 DIAGNOSIS — E28.2 PCOS (POLYCYSTIC OVARIAN SYNDROME): ICD-10-CM

## 2024-10-10 RX ORDER — ERGOCALCIFEROL 1.25 MG/1
50000 CAPSULE, LIQUID FILLED ORAL
COMMUNITY
End: 2024-10-10 | Stop reason: SDUPTHER

## 2024-10-10 RX ORDER — CHOLECALCIFEROL (VITAMIN D3) 25 MCG
2 TABLET ORAL DAILY
COMMUNITY
Start: 2024-10-09 | End: 2024-10-10

## 2024-10-10 RX ORDER — ERGOCALCIFEROL 1.25 MG/1
50000 CAPSULE, LIQUID FILLED ORAL
Qty: 5 CAPSULE | Refills: 3 | Status: SHIPPED | OUTPATIENT
Start: 2024-10-10

## 2024-11-11 ENCOUNTER — TELEPHONE (OUTPATIENT)
Dept: FAMILY MEDICINE CLINIC | Facility: CLINIC | Age: 34
End: 2024-11-11
Payer: MEDICAID

## 2024-11-11 DIAGNOSIS — E55.9 VITAMIN D DEFICIENCY: ICD-10-CM

## 2024-11-11 NOTE — TELEPHONE ENCOUNTER
Caller: Melissa Sánchez    Relationship: Self    Best call back number: [phone]     What is the best time to reach you: ANY    Who are you requesting to speak with (clinical staff, provider,  specific staff member): PCP        What was the call regarding: PATIENT HAD REQUESTED VITAMIN D3 5000 ICU.  AND PCP SENT IN VITAMIN D [id number] ICU, PATIENT IS WANTING TO KNOW WHY IT WAS INCREASED AND IF SHE NEEDS TO TAKE THAT ALONG WITH HER 5000 ICU DOSE  Is it okay if the provider responds through MyChart: [email or web address] [email or web address] NOT WORKING

## 2024-11-12 ENCOUNTER — DOCUMENTATION (OUTPATIENT)
Dept: FAMILY MEDICINE CLINIC | Facility: CLINIC | Age: 34
End: 2024-11-12
Payer: MEDICAID

## 2024-11-12 RX ORDER — LISDEXAMFETAMINE DIMESYLATE 40 MG/1
40 CAPSULE ORAL EVERY MORNING
COMMUNITY
Start: 2024-10-30

## 2024-11-12 RX ORDER — LISDEXAMFETAMINE DIMESYLATE 20 MG/1
20 CAPSULE ORAL EVERY MORNING
COMMUNITY
Start: 2024-10-01

## 2024-11-12 NOTE — PROGRESS NOTES
THE HUB WARM TRANSFERRED CALL. RELAYED TO PT DR. LAGUERRE'S NEW MESSAGE FOR VIT D3 5000 UNITS SENT INTO LOCAL [facility].  PT VOICED UNDERSTANDING. NO LONGER TAKE THE BIWEEKLY.

## 2024-12-05 DIAGNOSIS — G89.29 CHRONIC PAIN OF BOTH KNEES: Primary | ICD-10-CM

## 2024-12-05 DIAGNOSIS — M25.561 CHRONIC PAIN OF BOTH KNEES: Primary | ICD-10-CM

## 2024-12-05 DIAGNOSIS — M25.562 CHRONIC PAIN OF BOTH KNEES: Primary | ICD-10-CM

## 2025-01-16 ENCOUNTER — PATIENT MESSAGE (OUTPATIENT)
Dept: FAMILY MEDICINE CLINIC | Facility: CLINIC | Age: 35
End: 2025-01-16

## 2025-01-16 DIAGNOSIS — G89.29 CHRONIC MUSCULOSKELETAL PAIN: ICD-10-CM

## 2025-01-16 DIAGNOSIS — M79.7 FIBROMYALGIA: Primary | ICD-10-CM

## 2025-01-16 DIAGNOSIS — M25.50 POLYARTHRALGIA: ICD-10-CM

## 2025-01-16 DIAGNOSIS — M79.18 CHRONIC MUSCULOSKELETAL PAIN: ICD-10-CM

## 2025-01-17 DIAGNOSIS — M25.50 CHRONIC PAIN OF MULTIPLE JOINTS: Primary | ICD-10-CM

## 2025-01-17 DIAGNOSIS — G89.29 CHRONIC PAIN OF MULTIPLE JOINTS: Primary | ICD-10-CM

## 2025-02-27 ENCOUNTER — LAB (OUTPATIENT)
Facility: HOSPITAL | Age: 35
End: 2025-02-27
Payer: COMMERCIAL

## 2025-02-27 ENCOUNTER — OFFICE VISIT (OUTPATIENT)
Age: 35
End: 2025-02-27
Payer: COMMERCIAL

## 2025-02-27 VITALS
SYSTOLIC BLOOD PRESSURE: 116 MMHG | TEMPERATURE: 98 F | WEIGHT: 220 LBS | HEART RATE: 80 BPM | OXYGEN SATURATION: 99 % | BODY MASS INDEX: 36.65 KG/M2 | DIASTOLIC BLOOD PRESSURE: 70 MMHG | HEIGHT: 65 IN

## 2025-02-27 DIAGNOSIS — G89.29 CHRONIC MUSCULOSKELETAL PAIN: ICD-10-CM

## 2025-02-27 DIAGNOSIS — M79.7 FIBROMYALGIA: ICD-10-CM

## 2025-02-27 DIAGNOSIS — M25.50 POLYARTHRALGIA: Primary | ICD-10-CM

## 2025-02-27 DIAGNOSIS — M79.18 CHRONIC MUSCULOSKELETAL PAIN: ICD-10-CM

## 2025-02-27 DIAGNOSIS — M35.7 BENIGN JOINT HYPERMOBILITY SYNDROME: ICD-10-CM

## 2025-02-27 LAB
ALBUMIN SERPL-MCNC: 4.3 G/DL (ref 3.5–5.2)
ALBUMIN/GLOB SERPL: 1.3 G/DL
ALP SERPL-CCNC: 107 U/L (ref 39–117)
ALT SERPL W P-5'-P-CCNC: 21 U/L (ref 1–33)
ANION GAP SERPL CALCULATED.3IONS-SCNC: 11.5 MMOL/L (ref 5–15)
AST SERPL-CCNC: 23 U/L (ref 1–32)
BACTERIA UR QL AUTO: ABNORMAL /HPF
BASOPHILS # BLD AUTO: 0.06 10*3/MM3 (ref 0–0.2)
BASOPHILS NFR BLD AUTO: 0.6 % (ref 0–1.5)
BILIRUB SERPL-MCNC: 0.2 MG/DL (ref 0–1.2)
BILIRUB UR QL STRIP: NEGATIVE
BUN SERPL-MCNC: 10 MG/DL (ref 6–20)
BUN/CREAT SERPL: 13.5 (ref 7–25)
C3 SERPL-MCNC: 149 MG/DL (ref 82–167)
C4 SERPL-MCNC: 31 MG/DL (ref 14–44)
CALCIUM SPEC-SCNC: 9.3 MG/DL (ref 8.6–10.5)
CHLORIDE SERPL-SCNC: 100 MMOL/L (ref 98–107)
CK SERPL-CCNC: 58 U/L (ref 20–180)
CLARITY UR: CLEAR
CO2 SERPL-SCNC: 24.5 MMOL/L (ref 22–29)
COLOR UR: YELLOW
CREAT SERPL-MCNC: 0.74 MG/DL (ref 0.57–1)
CREAT UR-MCNC: 143.9 MG/DL
CRP SERPL-MCNC: <0.3 MG/DL (ref 0–0.5)
DEPRECATED RDW RBC AUTO: 39.2 FL (ref 37–54)
EGFRCR SERPLBLD CKD-EPI 2021: 109 ML/MIN/1.73
EOSINOPHIL # BLD AUTO: 0.32 10*3/MM3 (ref 0–0.4)
EOSINOPHIL NFR BLD AUTO: 3.2 % (ref 0.3–6.2)
ERYTHROCYTE [DISTWIDTH] IN BLOOD BY AUTOMATED COUNT: 12.2 % (ref 12.3–15.4)
ERYTHROCYTE [SEDIMENTATION RATE] IN BLOOD: 14 MM/HR (ref 0–20)
GLOBULIN UR ELPH-MCNC: 3.2 GM/DL
GLUCOSE SERPL-MCNC: 89 MG/DL (ref 65–99)
GLUCOSE UR STRIP-MCNC: NEGATIVE MG/DL
HCT VFR BLD AUTO: 42.4 % (ref 34–46.6)
HGB BLD-MCNC: 14.1 G/DL (ref 12–15.9)
HGB UR QL STRIP.AUTO: ABNORMAL
HYALINE CASTS UR QL AUTO: ABNORMAL /LPF
IMM GRANULOCYTES # BLD AUTO: 0.04 10*3/MM3 (ref 0–0.05)
IMM GRANULOCYTES NFR BLD AUTO: 0.4 % (ref 0–0.5)
KETONES UR QL STRIP: NEGATIVE
LEUKOCYTE ESTERASE UR QL STRIP.AUTO: ABNORMAL
LYMPHOCYTES # BLD AUTO: 2.2 10*3/MM3 (ref 0.7–3.1)
LYMPHOCYTES NFR BLD AUTO: 22.2 % (ref 19.6–45.3)
MCH RBC QN AUTO: 29.3 PG (ref 26.6–33)
MCHC RBC AUTO-ENTMCNC: 33.3 G/DL (ref 31.5–35.7)
MCV RBC AUTO: 88 FL (ref 79–97)
MONOCYTES # BLD AUTO: 0.59 10*3/MM3 (ref 0.1–0.9)
MONOCYTES NFR BLD AUTO: 5.9 % (ref 5–12)
NEUTROPHILS NFR BLD AUTO: 6.71 10*3/MM3 (ref 1.7–7)
NEUTROPHILS NFR BLD AUTO: 67.7 % (ref 42.7–76)
NITRITE UR QL STRIP: NEGATIVE
NRBC BLD AUTO-RTO: 0 /100 WBC (ref 0–0.2)
PH UR STRIP.AUTO: 5.5 [PH] (ref 5–8)
PLATELET # BLD AUTO: 325 10*3/MM3 (ref 140–450)
PMV BLD AUTO: 10.6 FL (ref 6–12)
POTASSIUM SERPL-SCNC: 3.8 MMOL/L (ref 3.5–5.2)
PROT ?TM UR-MCNC: 8.5 MG/DL
PROT SERPL-MCNC: 7.5 G/DL (ref 6–8.5)
PROT UR QL STRIP: NEGATIVE
PROT/CREAT UR: 59.1 MG/G CREA (ref 0–200)
RBC # BLD AUTO: 4.82 10*6/MM3 (ref 3.77–5.28)
RBC # UR STRIP: ABNORMAL /HPF
REF LAB TEST METHOD: ABNORMAL
SODIUM SERPL-SCNC: 136 MMOL/L (ref 136–145)
SP GR UR STRIP: 1.02 (ref 1–1.03)
SQUAMOUS #/AREA URNS HPF: ABNORMAL /HPF
UROBILINOGEN UR QL STRIP: ABNORMAL
WBC # UR STRIP: ABNORMAL /HPF
WBC NRBC COR # BLD AUTO: 9.92 10*3/MM3 (ref 3.4–10.8)

## 2025-02-27 PROCEDURE — 86431 RHEUMATOID FACTOR QUANT: CPT | Performed by: STUDENT IN AN ORGANIZED HEALTH CARE EDUCATION/TRAINING PROGRAM

## 2025-02-27 PROCEDURE — 85613 RUSSELL VIPER VENOM DILUTED: CPT | Performed by: STUDENT IN AN ORGANIZED HEALTH CARE EDUCATION/TRAINING PROGRAM

## 2025-02-27 PROCEDURE — 1160F RVW MEDS BY RX/DR IN RCRD: CPT | Performed by: STUDENT IN AN ORGANIZED HEALTH CARE EDUCATION/TRAINING PROGRAM

## 2025-02-27 PROCEDURE — G2212 PROLONG OUTPT/OFFICE VIS: HCPCS | Performed by: STUDENT IN AN ORGANIZED HEALTH CARE EDUCATION/TRAINING PROGRAM

## 2025-02-27 PROCEDURE — 86235 NUCLEAR ANTIGEN ANTIBODY: CPT | Performed by: STUDENT IN AN ORGANIZED HEALTH CARE EDUCATION/TRAINING PROGRAM

## 2025-02-27 PROCEDURE — 82570 ASSAY OF URINE CREATININE: CPT | Performed by: STUDENT IN AN ORGANIZED HEALTH CARE EDUCATION/TRAINING PROGRAM

## 2025-02-27 PROCEDURE — 86140 C-REACTIVE PROTEIN: CPT | Performed by: STUDENT IN AN ORGANIZED HEALTH CARE EDUCATION/TRAINING PROGRAM

## 2025-02-27 PROCEDURE — 86037 ANCA TITER EACH ANTIBODY: CPT | Performed by: STUDENT IN AN ORGANIZED HEALTH CARE EDUCATION/TRAINING PROGRAM

## 2025-02-27 PROCEDURE — 36415 COLL VENOUS BLD VENIPUNCTURE: CPT | Performed by: STUDENT IN AN ORGANIZED HEALTH CARE EDUCATION/TRAINING PROGRAM

## 2025-02-27 PROCEDURE — 86160 COMPLEMENT ANTIGEN: CPT | Performed by: STUDENT IN AN ORGANIZED HEALTH CARE EDUCATION/TRAINING PROGRAM

## 2025-02-27 PROCEDURE — 85670 THROMBIN TIME PLASMA: CPT | Performed by: STUDENT IN AN ORGANIZED HEALTH CARE EDUCATION/TRAINING PROGRAM

## 2025-02-27 PROCEDURE — 81001 URINALYSIS AUTO W/SCOPE: CPT | Performed by: STUDENT IN AN ORGANIZED HEALTH CARE EDUCATION/TRAINING PROGRAM

## 2025-02-27 PROCEDURE — 86038 ANTINUCLEAR ANTIBODIES: CPT | Performed by: STUDENT IN AN ORGANIZED HEALTH CARE EDUCATION/TRAINING PROGRAM

## 2025-02-27 PROCEDURE — 86146 BETA-2 GLYCOPROTEIN ANTIBODY: CPT | Performed by: STUDENT IN AN ORGANIZED HEALTH CARE EDUCATION/TRAINING PROGRAM

## 2025-02-27 PROCEDURE — 85732 THROMBOPLASTIN TIME PARTIAL: CPT | Performed by: STUDENT IN AN ORGANIZED HEALTH CARE EDUCATION/TRAINING PROGRAM

## 2025-02-27 PROCEDURE — 83516 IMMUNOASSAY NONANTIBODY: CPT | Performed by: STUDENT IN AN ORGANIZED HEALTH CARE EDUCATION/TRAINING PROGRAM

## 2025-02-27 PROCEDURE — 86147 CARDIOLIPIN ANTIBODY EA IG: CPT | Performed by: STUDENT IN AN ORGANIZED HEALTH CARE EDUCATION/TRAINING PROGRAM

## 2025-02-27 PROCEDURE — 85652 RBC SED RATE AUTOMATED: CPT | Performed by: STUDENT IN AN ORGANIZED HEALTH CARE EDUCATION/TRAINING PROGRAM

## 2025-02-27 PROCEDURE — 86800 THYROGLOBULIN ANTIBODY: CPT | Performed by: STUDENT IN AN ORGANIZED HEALTH CARE EDUCATION/TRAINING PROGRAM

## 2025-02-27 PROCEDURE — 80053 COMPREHEN METABOLIC PANEL: CPT | Performed by: STUDENT IN AN ORGANIZED HEALTH CARE EDUCATION/TRAINING PROGRAM

## 2025-02-27 PROCEDURE — 86200 CCP ANTIBODY: CPT | Performed by: STUDENT IN AN ORGANIZED HEALTH CARE EDUCATION/TRAINING PROGRAM

## 2025-02-27 PROCEDURE — 85705 THROMBOPLASTIN INHIBITION: CPT | Performed by: STUDENT IN AN ORGANIZED HEALTH CARE EDUCATION/TRAINING PROGRAM

## 2025-02-27 PROCEDURE — 1159F MED LIST DOCD IN RCRD: CPT | Performed by: STUDENT IN AN ORGANIZED HEALTH CARE EDUCATION/TRAINING PROGRAM

## 2025-02-27 PROCEDURE — 85025 COMPLETE CBC W/AUTO DIFF WBC: CPT | Performed by: STUDENT IN AN ORGANIZED HEALTH CARE EDUCATION/TRAINING PROGRAM

## 2025-02-27 PROCEDURE — 86255 FLUORESCENT ANTIBODY SCREEN: CPT | Performed by: STUDENT IN AN ORGANIZED HEALTH CARE EDUCATION/TRAINING PROGRAM

## 2025-02-27 PROCEDURE — 99205 OFFICE O/P NEW HI 60 MIN: CPT | Performed by: STUDENT IN AN ORGANIZED HEALTH CARE EDUCATION/TRAINING PROGRAM

## 2025-02-27 PROCEDURE — 84156 ASSAY OF PROTEIN URINE: CPT | Performed by: STUDENT IN AN ORGANIZED HEALTH CARE EDUCATION/TRAINING PROGRAM

## 2025-02-27 PROCEDURE — 86376 MICROSOMAL ANTIBODY EACH: CPT | Performed by: STUDENT IN AN ORGANIZED HEALTH CARE EDUCATION/TRAINING PROGRAM

## 2025-02-27 PROCEDURE — 82550 ASSAY OF CK (CPK): CPT | Performed by: STUDENT IN AN ORGANIZED HEALTH CARE EDUCATION/TRAINING PROGRAM

## 2025-02-27 RX ORDER — ASPIRIN 81 MG
81 TABLET,CHEWABLE ORAL DAILY
COMMUNITY
Start: 2025-01-24
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
## 2025-02-27 NOTE — TELEPHONE ENCOUNTER
I SENT DR LAGUERRE A CHAT MESSAGE ASKING HER IF SHE CAN PLEASE PUT IN A NEW PAIN MGMT REFERRAL FOR PT'S DIAGNOSIS OF EDS, FIBROMYALGIA AND  CHRONIC MUSCULOSKELETAL PAIN. [place]

## 2025-02-28 DIAGNOSIS — Z30.019 ENCOUNTER FOR INITIAL PRESCRIPTION OF CONTRACEPTIVES, UNSPECIFIED CONTRACEPTIVE: ICD-10-CM

## 2025-02-28 LAB
B2 GLYCOPROT1 IGA SER-ACNC: <9 GPI IGA UNITS (ref 0–25)
B2 GLYCOPROT1 IGG SER-ACNC: <9 GPI IGG UNITS (ref 0–20)
B2 GLYCOPROT1 IGM SER-ACNC: <9 GPI IGM UNITS (ref 0–32)
C-ANCA TITR SER IF: NORMAL TITER
CARDIOLIPIN IGG SER IA-ACNC: <9 GPL U/ML (ref 0–14)
CARDIOLIPIN IGM SER IA-ACNC: 9 MPL U/ML (ref 0–12)
CCP IGA+IGG SERPL IA-ACNC: 3 UNITS (ref 0–19)
ENA RNP AB SER-ACNC: <0.2 AI (ref 0–0.9)
ENA SCL70 AB SER-ACNC: <0.2 AI (ref 0–0.9)
ENA SM AB SER-ACNC: <0.2 AI (ref 0–0.9)
ENA SS-A AB SER-ACNC: <0.2 AI (ref 0–0.9)
ENA SS-B AB SER-ACNC: <0.2 AI (ref 0–0.9)
MYELOPEROXIDASE AB SER IA-ACNC: <0.2 UNITS (ref 0–0.9)
P-ANCA ATYPICAL TITR SER IF: NORMAL TITER
P-ANCA TITR SER IF: NORMAL TITER
PROTEINASE3 AB SER IA-ACNC: <0.2 UNITS (ref 0–0.9)
RHEUMATOID FACT SERPL-ACNC: <10 IU/ML
THYROGLOB AB SERPL-ACNC: <1 IU/ML (ref 0–0.9)
THYROPEROXIDASE AB SERPL-ACNC: 13 IU/ML (ref 0–34)

## 2025-03-01 LAB
ANA SER QL IF: NEGATIVE
APTT SCREEN TO CONFIRM RATIO: 1.11 RATIO (ref 0–1.34)
CONFIRM APTT/NORMAL: 41.8 SEC (ref 0–47.6)
DSDNA AB SER QL CLIF: NEGATIVE
LA 2 SCREEN W REFLEX-IMP: NORMAL
SCREEN APTT: 30.7 SEC (ref 0–43.5)
SCREEN DRVVT: 41.4 SEC (ref 0–47)
THROMBIN TIME: 17.1 SEC (ref 0–23)

## 2025-03-03 DIAGNOSIS — Z30.019 ENCOUNTER FOR INITIAL PRESCRIPTION OF CONTRACEPTIVES, UNSPECIFIED CONTRACEPTIVE: ICD-10-CM

## 2025-03-04 PROBLEM — M35.7 BENIGN JOINT HYPERMOBILITY SYNDROME: Status: ACTIVE | Noted: 2025-03-04

## 2025-03-04 PROBLEM — M25.50 POLYARTHRALGIA: Status: ACTIVE | Noted: 2025-03-04

## 2025-03-04 PROBLEM — G89.29 CHRONIC MUSCULOSKELETAL PAIN: Status: ACTIVE | Noted: 2025-03-04

## 2025-03-04 PROBLEM — M79.18 CHRONIC MUSCULOSKELETAL PAIN: Status: ACTIVE | Noted: 2025-03-04

## 2025-03-05 ENCOUNTER — TELEPHONE (OUTPATIENT)
Age: 35
End: 2025-03-05
Payer: COMMERCIAL

## 2025-03-05 NOTE — PROGRESS NOTES
Please notify patient of their result    Dear MsGilGonzalo,  Thank you for having your labs done  Here is  a summary of your results:  Urinalysis shows some RBCs but no red blood cell casts.  You could see this with urinary infection or any history of kidney stones or if samples we are collected at a time for menses.  If there are any concerns please follow-up with your primary care physician also you can be referred to urology.  Importantly, the protein creatinine ratio is normal  Your autoimmune screening for lupus: BRITTANI, dsDNA, antiphospholipid antibodies (beta-2 glycoprotein 1, anticardiolipin, lupus anticoagulant), complements-C3, C4, ANCA panel, DEANNA panel are negative/within normal  Rheumatoid arthritis profile-RF, CCP and negative  Thyroid antibodies, creatinine kinase, CMP, inflammatory markers (CRP, ESR) are all normal  , Overall,  there is no concern for an autoimmune or rheumatological condition.  This correlates with your unremarkable exam.  You do not need routine follow-up with rheumatology.

## 2025-03-05 NOTE — PATIENT INSTRUCTIONS
Thank you for your new patient visit with rheumatology  You are evaluated for chronic multiple joint pain and you have concern for autoimmune disease  Today's evaluation is unremarkable  Here is what we discussed:  Will get some labs done today per your request  Continue conservative therapy for management of fibromyalgia and joint hypermobility  Will contact you to discuss results of your labs

## 2025-03-20 ENCOUNTER — TELEPHONE (OUTPATIENT)
Dept: FAMILY MEDICINE CLINIC | Facility: CLINIC | Age: 35
End: 2025-03-20
Payer: COMMERCIAL

## 2025-03-20 DIAGNOSIS — E55.9 VITAMIN D DEFICIENCY: Primary | ICD-10-CM

## 2025-03-20 NOTE — TELEPHONE ENCOUNTER
PT NEEDS REFILL ON VITAMIN D3 TO [facility] ON FILE. ALSO, PT HAS NOT HEARD ABOUT GENETIC TESTING IN OVER A MONTH. PLEASE CONTACT PT IN REGARDS TO THIS.     THANK YOU

## 2025-03-24 DIAGNOSIS — E55.9 VITAMIN D DEFICIENCY: ICD-10-CM

## 2025-03-24 NOTE — TELEPHONE ENCOUNTER
Rx Refill Note  Requested Prescriptions     Pending Prescriptions Disp Refills    Cholecalciferol (Vitamin D-3) 125 MCG (5000 UT) tablet 30 tablet 3     Sig: Take 1 tablet by mouth Daily.      Last office visit with prescribing clinician: 9/27/2024   Last telemedicine visit with prescribing clinician: Visit date not found   Next office visit with prescribing clinician: Visit date not found                         Would you like a call back once the refill request has been completed: [] Yes [] No    If the office needs to give you a call back, can they leave a voicemail: [] Yes [] No    Kristy Vazquez MA  03/24/25, 08:07 EDT

## 2025-03-26 ENCOUNTER — OFFICE VISIT (OUTPATIENT)
Dept: FAMILY MEDICINE CLINIC | Facility: CLINIC | Age: 35
End: 2025-03-26
Payer: COMMERCIAL

## 2025-03-26 VITALS
TEMPERATURE: 97.8 F | OXYGEN SATURATION: 99 % | BODY MASS INDEX: 36.65 KG/M2 | DIASTOLIC BLOOD PRESSURE: 74 MMHG | SYSTOLIC BLOOD PRESSURE: 120 MMHG | RESPIRATION RATE: 18 BRPM | HEART RATE: 92 BPM | HEIGHT: 65 IN | WEIGHT: 220 LBS

## 2025-03-26 DIAGNOSIS — R63.5 WEIGHT GAIN: ICD-10-CM

## 2025-03-26 DIAGNOSIS — M79.7 FIBROMYALGIA: Primary | ICD-10-CM

## 2025-03-26 PROCEDURE — 99214 OFFICE O/P EST MOD 30 MIN: CPT | Performed by: INTERNAL MEDICINE

## 2025-03-26 PROCEDURE — 1125F AMNT PAIN NOTED PAIN PRSNT: CPT | Performed by: INTERNAL MEDICINE

## 2025-03-26 RX ORDER — DULOXETIN HYDROCHLORIDE 60 MG/1
60 CAPSULE, DELAYED RELEASE ORAL DAILY
Qty: 30 CAPSULE | Refills: 3 | Status: SHIPPED | OUTPATIENT
Start: 2025-03-26

## 2025-03-26 NOTE — PROGRESS NOTES
Chief Complaint  Primary Care Follow-Up (Pt here to discuss EDS dx and genetic testing and GLP-1 options for WEIGHT LOSS) and Weight Loss    Subjective        Melissa Sánchez presents to [facility]  History of Present Illness  Patient is here today with complaint of weight gain and to discuss EDS genetic testing.  She was seen by rheumatologist a month ago and was told she might have EDS and to follow-up with genetic testing.  Patient reports she has hypermobility syndrome in the past but has been inactive due to surgery and still has been experiencing muscle and joint stiffness.  She also feels like she has connective tissue disease due to multiple joint(knees and ankle) problems in surgery in the past.  She recently underwent left complex ankle arthroscopy with debridement and left complex lateral ligament reconstruction due to osteochondral defect in January 2025.  She is to begin physical therapy to regain muscle strength and function.    She has history of fibromyalgia and has been experiencing chronic pain for most of her life.  She has been trying holistic medicine with acupuncture, lymphatic massage, and aquatic therapy which has been a little helpful for her but she is still experiencing chronic generalized pain syndrome despite these measures.  She is scheduled to follow-up with pain management next month.    She also complains of weight gain for the past few months since the surgery.  She states to have gained about 30 pounds since December.  She repots no dietary changes, has not increased her portion of food, even more so has not been eating much however she has not been physically active as she used to because of her recent surgery.  She is looking for help with weight loss medication.  She was previously on metformin 850 mg for insulin resistance but discontinued due to severe diarrhea side effect.  Weight Management  Weight:  Increased  Weight change (lbs):   "30  Weight loss treatment:  Portion control, emotional and/or stress management and decreasing alcohol consumption  Treatment barriers:  Medication cost and medication side effects  Physical activity tolerance:  Worse  Energy level:  Decreased      Objective   Vital Signs:  /74 (BP Location: Right arm, Patient Position: Sitting, Cuff Size: Adult)   Pulse 92   Temp 97.8 °F (36.6 °C) (Temporal)   Resp 18   Ht 165.1 cm (65\")   Wt 99.8 kg (220 lb)   SpO2 99%   BMI 36.61 kg/m²   Estimated body mass index is 36.61 kg/m² as calculated from the following:    Height as of this encounter: 165.1 cm (65\").    Weight as of this encounter: 99.8 kg (220 lb).          Physical Exam  Constitutional:       Appearance: Normal appearance.   Cardiovascular:      Heart sounds: Normal heart sounds.   Pulmonary:      Breath sounds: Normal breath sounds.   Musculoskeletal:      Right shoulder: Normal.      Left shoulder: Normal.      Right elbow: Normal.      Left elbow: Normal.      Right wrist: Normal.      Left wrist: Normal.      Right knee: Normal.      Left knee: Normal.      Comments: No hyperextension of the elbow, wrist, thumb or knee joints.   Neurological:      Mental Status: She is alert and oriented to person, place, and time.        Result Review :  The following data was reviewed by: Lilliam Johnson MD on 03/26/2025:  Common labs          9/24/2024    13:33 9/27/2024    14:43 2/27/2025    10:03   Common Labs   Glucose  86  89    BUN  9  10    Creatinine  0.86  0.74    Sodium  137  136    Potassium  4.3  3.8    Chloride  99  100    Calcium  9.9  9.3    Albumin   4.3    Total Bilirubin   0.2    Alkaline Phosphatase   107    AST (SGOT)   23    ALT (SGPT)   21    WBC 8.90   9.92    Hemoglobin 14.2   14.1    Hematocrit 42.7   42.4    Platelets 328   325    Total Cholesterol  182     Triglycerides  106     HDL Cholesterol  69     LDL Cholesterol   94     Hemoglobin A1C  5.4                 Assessment and Plan "   Diagnoses and all orders for this visit:    1. Fibromyalgia (Primary)  -     DULoxetine (CYMBALTA) 60 MG capsule; Take 1 capsule by mouth Daily.  Dispense: 30 capsule; Refill: 3    2. Weight gain  -     metFORMIN (GLUCOPHAGE) 500 MG tablet; Take 1 tablet by mouth Daily.  Dispense: 30 tablet; Refill: 3    I do believe her chronic pain is most likely due to fibromyalgia.  We discussed different therapeutic options for fibromyalgia including TCA antidepressant, duloxetine/milnacipran & Gabapentin/pregabalin.  Patient opted to go for duloxetine as she is concerned about weight gain and does not want to be on stimulant medications.  I do not suspect EDS as physical examination does not meet the criteria's for hypermobility syndrome therefore I do not consider genetic testing for EDS at this time.  Review of lab for cognitive tissue disease, autoimmune disease and chronic inflammation were all negative.  As regards to weight gain, will discuss weight loss medication options such as phentermine, phentermine/topiramate, naltrexone/bupropion and GLP-1 agonist.  Due to lack of insurance coverage and high cost of medication, GLP-1's will not be cost effective for her.  Patient does not desire to be on any stimulant medication at this time.  We agreed to go back on metformin at a decreased dose of 500 mg daily and monitor for side effect tolerability.             Follow Up   No follow-ups on file.  Patient was given instructions and counseling regarding her condition or for health maintenance advice. Please see specific information pulled into the AVS if appropriate.

## 2025-04-07 ENCOUNTER — OFFICE VISIT (OUTPATIENT)
Dept: OBSTETRICS AND GYNECOLOGY | Facility: CLINIC | Age: 35
End: 2025-04-07
Payer: COMMERCIAL

## 2025-04-07 VITALS — WEIGHT: 220 LBS | DIASTOLIC BLOOD PRESSURE: 72 MMHG | SYSTOLIC BLOOD PRESSURE: 128 MMHG | BODY MASS INDEX: 36.61 KG/M2

## 2025-04-07 DIAGNOSIS — Z01.419 ENCOUNTER FOR WELL WOMAN EXAM WITH ROUTINE GYNECOLOGICAL EXAM: Primary | ICD-10-CM

## 2025-04-07 PROCEDURE — 99395 PREV VISIT EST AGE 18-39: CPT | Performed by: STUDENT IN AN ORGANIZED HEALTH CARE EDUCATION/TRAINING PROGRAM

## 2025-04-07 PROCEDURE — 1160F RVW MEDS BY RX/DR IN RCRD: CPT | Performed by: STUDENT IN AN ORGANIZED HEALTH CARE EDUCATION/TRAINING PROGRAM

## 2025-04-07 PROCEDURE — 99459 PELVIC EXAMINATION: CPT | Performed by: STUDENT IN AN ORGANIZED HEALTH CARE EDUCATION/TRAINING PROGRAM

## 2025-04-07 PROCEDURE — 1159F MED LIST DOCD IN RCRD: CPT | Performed by: STUDENT IN AN ORGANIZED HEALTH CARE EDUCATION/TRAINING PROGRAM

## 2025-04-07 PROCEDURE — 2014F MENTAL STATUS ASSESS: CPT | Performed by: STUDENT IN AN ORGANIZED HEALTH CARE EDUCATION/TRAINING PROGRAM
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
## 2025-07-02 ENCOUNTER — TELEPHONE (OUTPATIENT)
Dept: FAMILY MEDICINE CLINIC | Facility: CLINIC | Age: 35
End: 2025-07-02

## 2025-07-02 ENCOUNTER — TELEPHONE (OUTPATIENT)
Dept: FAMILY MEDICINE CLINIC | Facility: CLINIC | Age: 35
End: 2025-07-02
Payer: COMMERCIAL

## 2025-07-02 NOTE — TELEPHONE ENCOUNTER
Caller: Melissa Sánchez    Relationship: Self    Best call back number: [phone]     What was the call regarding: PATIENT STATES SHE IS NEEDING THE TB SKIN TEST FOR HER JOB AND IS WANTING TO KNOW IF  DR LAGUERRE CAN SIGN A PAPER STATING SHE HAS REALLY BAD REACTIONS TO THIS SO SHE DOES NOT HAVE TO DO IT.     PLEASE CALL AND ADVISE

## 2025-07-02 NOTE — TELEPHONE ENCOUNTER
Per pt, she doesn't need the note but is having labs drawn and wants to know if you can order her tb skin test.

## 2025-07-21 NOTE — TELEPHONE ENCOUNTER
PT IS REQUESTING TO SEE IF ON CALL  CAN TAKE CARE OF THIS BECAUSE SHE HAS BEEN WAITING WEEKS, SHE REQUESTED THIS AND WAS GIVEN SAME EXCUSE SINCE DR LAGUERRE WAS NOT IN THE OFFICE AND IS WANTING TO KNOW IF WE CAN JUST FILL OUT PAPERWORK, NOT SURE IF PT JUST MEANS A LETTER, MAY NEED TO REACH OUT TO PT TO DOUBLE CHECK ON THIS.. FOR HER NOT TO DO TB TEST SINCE SHE REACTS TO SKIN TEST PLUS SHE IS OUT OF TOWN AT THIS POINT AND MAY LOSE HER JOB BECAUSE OF THIS. SHE SAID SHE IS PROBABLY GOING TO HAVE TO END UP FINDING A NEW PCP OFFICE BECAUSE SHE CANNOT RELY ON OUR OFFICE FOR SOMETHING LIKE THIS OR A REFERRAL AND FEELS LIKE SHE GETS THE RUN AROUND EVERY TIME FROM OUR STAFF.  PT NEEDS TO KNOW ANSWER/NEEDS THIS DONE ASAP BECAUSE IT HAS TO BE DONE BY THE 25TH.

## 2025-07-22 DIAGNOSIS — Z91.89 TUBERCULOSIS HIGH RISK: Primary | ICD-10-CM

## 2025-07-24 ENCOUNTER — LAB (OUTPATIENT)
Facility: HOSPITAL | Age: 35
End: 2025-07-24
Payer: COMMERCIAL

## 2025-07-24 PROCEDURE — 36415 COLL VENOUS BLD VENIPUNCTURE: CPT | Performed by: INTERNAL MEDICINE

## 2025-07-27 DIAGNOSIS — M79.7 FIBROMYALGIA: ICD-10-CM

## 2025-07-28 RX ORDER — DULOXETIN HYDROCHLORIDE 60 MG/1
60 CAPSULE, DELAYED RELEASE ORAL DAILY
Qty: 30 CAPSULE | Refills: 0 | Status: SHIPPED | OUTPATIENT
Start: 2025-07-28

## 2025-07-29 LAB
GAMMA INTERFERON BACKGROUND BLD IA-ACNC: 0.02 IU/ML
M TB IFN-G BLD-IMP: NEGATIVE
M TB IFN-G CD4+ BCKGRND COR BLD-ACNC: 0.04 IU/ML
M TB IFN-G CD4+CD8+ BCKGRND COR BLD-ACNC: 0.04 IU/ML
MITOGEN IGNF BCKGRD COR BLD-ACNC: >10 IU/ML
SERVICE CMNT-IMP: NORMAL